# Patient Record
Sex: MALE | Race: WHITE | NOT HISPANIC OR LATINO | Employment: OTHER | URBAN - NONMETROPOLITAN AREA
[De-identification: names, ages, dates, MRNs, and addresses within clinical notes are randomized per-mention and may not be internally consistent; named-entity substitution may affect disease eponyms.]

---

## 2017-12-01 ENCOUNTER — OFFICE VISIT (OUTPATIENT)
Dept: UROLOGY | Facility: CLINIC | Age: 82
End: 2017-12-01

## 2017-12-01 DIAGNOSIS — R31.0 GROSS HEMATURIA: Primary | ICD-10-CM

## 2017-12-01 LAB
BILIRUB BLD-MCNC: NEGATIVE MG/DL
CLARITY, POC: CLEAR
COLOR UR: ABNORMAL
GLUCOSE UR STRIP-MCNC: NEGATIVE MG/DL
KETONES UR QL: NEGATIVE
LEUKOCYTE EST, POC: NEGATIVE
NITRITE UR-MCNC: NEGATIVE MG/ML
PH UR: 6 [PH] (ref 5–8)
PROT UR STRIP-MCNC: ABNORMAL MG/DL
RBC # UR STRIP: ABNORMAL /UL
SP GR UR: 1 (ref 1–1.03)
UROBILINOGEN UR QL: NORMAL

## 2017-12-01 PROCEDURE — 81003 URINALYSIS AUTO W/O SCOPE: CPT | Performed by: UROLOGY

## 2017-12-01 PROCEDURE — 99213 OFFICE O/P EST LOW 20 MIN: CPT | Performed by: UROLOGY

## 2017-12-01 RX ORDER — ASPIRIN 81 MG/1
81 TABLET ORAL EVERY OTHER DAY
COMMUNITY

## 2017-12-01 NOTE — PROGRESS NOTES
Chief Complaint:          Chief Complaint   Patient presents with   • Blood in Urine       HPI:   84 y.o. male.    HPI  Pt did not need to cath himself very long. He has not had to cath himself for over 2 years.  He does take asa 81 mg every other day.  He has had intermittent gross hematuria for the past 2 days.  Pt had a turp by me in 2014.      Past Medical History:        Past Medical History:   Diagnosis Date   • BPH with urinary obstruction          Current Meds:     Current Outpatient Prescriptions   Medication Sig Dispense Refill   • aspirin 81 MG EC tablet Take 81 mg by mouth Daily.       No current facility-administered medications for this visit.         Allergies:      No Known Allergies     Past Surgical History:     Past Surgical History:   Procedure Laterality Date   • PROSTATE SURGERY           Social History:     Social History     Social History   • Marital status: Unknown     Spouse name: N/A   • Number of children: N/A   • Years of education: N/A     Occupational History   • Not on file.     Social History Main Topics   • Smoking status: Never Smoker   • Smokeless tobacco: Not on file   • Alcohol use No   • Drug use: No   • Sexual activity: Not on file     Other Topics Concern   • Not on file     Social History Narrative   • No narrative on file       Family History:     Family History   Problem Relation Age of Onset   • No Known Problems Father    • No Known Problems Mother        Review of Systems:     Review of Systems    Physical Exam:     Physical Exam   Constitutional: He is oriented to person, place, and time.   HENT:   Head: Normocephalic and atraumatic.   Right Ear: External ear normal.   Left Ear: External ear normal.   Nose: Nose normal.   Mouth/Throat: Oropharynx is clear and moist.   Eyes: Conjunctivae and EOM are normal. Pupils are equal, round, and reactive to light.   Neck: Normal range of motion. Neck supple. No thyromegaly present.   Cardiovascular: Normal rate, regular rhythm,  normal heart sounds and intact distal pulses.    No murmur heard.  Pulmonary/Chest: Effort normal and breath sounds normal. No respiratory distress. He has no wheezes. He has no rales. He exhibits no tenderness.   Abdominal: Soft. Bowel sounds are normal. He exhibits no distension and no mass. There is no tenderness. No hernia.   Genitourinary: Rectum normal, prostate normal and penis normal.   Musculoskeletal: Normal range of motion. He exhibits no edema or tenderness.   Lymphadenopathy:     He has no cervical adenopathy.   Neurological: He is alert and oriented to person, place, and time. No cranial nerve deficit. He exhibits normal muscle tone. Coordination normal.   Skin: Skin is warm. No rash noted.   Psychiatric: He has a normal mood and affect. His behavior is normal. Judgment and thought content normal.   Nursing note and vitals reviewed.      Procedure:     No notes on file      Assessment:     Encounter Diagnosis   Name Primary?   • Gross hematuria Yes     Orders Placed This Encounter   Procedures   • POC Urinalysis Dipstick, Automated       Plan:   Will see pt after he has ct scan renal mass protocol for cystoscopy and will have him hold asa for about a week.    Counseling was given to patient and family for the following topics impressions. and the interim medical history and current results were reviewed.  A treatment plan with follow-up was made. Total time of the encounter was 25 minutes and 25 minutes were spent discussing Gross hematuria [R31.0] face-to-face.        This document has been electronically signed by Arsalan Valdovinos MD December 1, 2017 3:42 PM

## 2021-03-22 ENCOUNTER — HOSPITAL ENCOUNTER (OUTPATIENT)
Dept: GENERAL RADIOLOGY | Facility: HOSPITAL | Age: 86
Discharge: HOME | End: 2021-03-22
Admitting: NURSE PRACTITIONER

## 2021-03-22 ENCOUNTER — TRANSCRIBE ORDERS (OUTPATIENT)
Dept: OTHER | Facility: OTHER | Age: 86
End: 2021-03-22

## 2021-03-22 DIAGNOSIS — M54.50 LOW BACK PAIN, UNSPECIFIED BACK PAIN LATERALITY, UNSPECIFIED CHRONICITY, UNSPECIFIED WHETHER SCIATICA PRESENT: Primary | ICD-10-CM

## 2021-03-22 DIAGNOSIS — M54.50 LOW BACK PAIN, UNSPECIFIED BACK PAIN LATERALITY, UNSPECIFIED CHRONICITY, UNSPECIFIED WHETHER SCIATICA PRESENT: ICD-10-CM

## 2021-03-22 PROCEDURE — 72110 X-RAY EXAM L-2 SPINE 4/>VWS: CPT

## 2021-03-22 PROCEDURE — 72110 X-RAY EXAM L-2 SPINE 4/>VWS: CPT | Performed by: RADIOLOGY

## 2021-05-25 ENCOUNTER — OFFICE VISIT (OUTPATIENT)
Dept: UROLOGY | Facility: CLINIC | Age: 86
End: 2021-05-25

## 2021-05-25 ENCOUNTER — HOSPITAL ENCOUNTER (OUTPATIENT)
Dept: GENERAL RADIOLOGY | Facility: HOSPITAL | Age: 86
Discharge: HOME | End: 2021-05-25
Admitting: NURSE PRACTITIONER

## 2021-05-25 VITALS — BODY MASS INDEX: 25.62 KG/M2 | WEIGHT: 173 LBS | HEIGHT: 69 IN

## 2021-05-25 DIAGNOSIS — N39.0 URINARY TRACT INFECTION WITH HEMATURIA, SITE UNSPECIFIED: ICD-10-CM

## 2021-05-25 DIAGNOSIS — R31.0 GROSS HEMATURIA: ICD-10-CM

## 2021-05-25 DIAGNOSIS — R10.9 BILATERAL FLANK PAIN: ICD-10-CM

## 2021-05-25 DIAGNOSIS — R31.0 GROSS HEMATURIA: Primary | ICD-10-CM

## 2021-05-25 DIAGNOSIS — R31.9 URINARY TRACT INFECTION WITH HEMATURIA, SITE UNSPECIFIED: ICD-10-CM

## 2021-05-25 LAB
BILIRUB BLD-MCNC: NEGATIVE MG/DL
CLARITY, POC: ABNORMAL
COLOR UR: ABNORMAL
GLUCOSE UR STRIP-MCNC: NEGATIVE MG/DL
KETONES UR QL: NEGATIVE
LEUKOCYTE EST, POC: ABNORMAL
NITRITE UR-MCNC: POSITIVE MG/ML
PH UR: 6.5 [PH] (ref 5–8)
PROT UR STRIP-MCNC: NEGATIVE MG/DL
RBC # UR STRIP: ABNORMAL /UL
SP GR UR: 1.01 (ref 1–1.03)
UROBILINOGEN UR QL: NORMAL

## 2021-05-25 PROCEDURE — 99204 OFFICE O/P NEW MOD 45 MIN: CPT | Performed by: NURSE PRACTITIONER

## 2021-05-25 PROCEDURE — 96372 THER/PROPH/DIAG INJ SC/IM: CPT | Performed by: NURSE PRACTITIONER

## 2021-05-25 PROCEDURE — 74018 RADEX ABDOMEN 1 VIEW: CPT | Performed by: RADIOLOGY

## 2021-05-25 PROCEDURE — 74018 RADEX ABDOMEN 1 VIEW: CPT

## 2021-05-25 PROCEDURE — 81003 URINALYSIS AUTO W/O SCOPE: CPT | Performed by: NURSE PRACTITIONER

## 2021-05-25 PROCEDURE — 87086 URINE CULTURE/COLONY COUNT: CPT | Performed by: NURSE PRACTITIONER

## 2021-05-25 RX ORDER — SIMVASTATIN 10 MG
10 TABLET ORAL NIGHTLY
COMMUNITY

## 2021-05-25 RX ORDER — MELOXICAM 15 MG/1
1 TABLET ORAL DAILY
COMMUNITY
Start: 2021-03-22 | End: 2021-05-25

## 2021-05-25 RX ORDER — ONDANSETRON 4 MG/1
4 TABLET, FILM COATED ORAL EVERY 12 HOURS PRN
Qty: 20 TABLET | Refills: 0 | Status: SHIPPED | OUTPATIENT
Start: 2021-05-25

## 2021-05-25 RX ORDER — GENTAMICIN SULFATE 40 MG/ML
80 INJECTION, SOLUTION INTRAMUSCULAR; INTRAVENOUS ONCE
Status: COMPLETED | OUTPATIENT
Start: 2021-05-25 | End: 2021-05-25

## 2021-05-25 RX ORDER — SULFAMETHOXAZOLE AND TRIMETHOPRIM 800; 160 MG/1; MG/1
1 TABLET ORAL 2 TIMES DAILY
Qty: 28 TABLET | Refills: 0 | Status: SHIPPED | OUTPATIENT
Start: 2021-05-25 | End: 2021-06-08

## 2021-05-25 RX ORDER — TAMSULOSIN HYDROCHLORIDE 0.4 MG/1
1 CAPSULE ORAL DAILY
Qty: 30 CAPSULE | Refills: 1 | Status: SHIPPED | OUTPATIENT
Start: 2021-05-25 | End: 2022-11-07 | Stop reason: SDUPTHER

## 2021-05-25 RX ADMIN — GENTAMICIN SULFATE 80 MG: 40 INJECTION, SOLUTION INTRAMUSCULAR; INTRAVENOUS at 15:43

## 2021-05-26 LAB — BACTERIA SPEC AEROBE CULT: NORMAL

## 2021-05-27 ENCOUNTER — TELEPHONE (OUTPATIENT)
Dept: UROLOGY | Facility: CLINIC | Age: 86
End: 2021-05-27

## 2021-06-07 ENCOUNTER — APPOINTMENT (OUTPATIENT)
Dept: CT IMAGING | Facility: HOSPITAL | Age: 86
End: 2021-06-07

## 2021-06-07 NOTE — PATIENT INSTRUCTIONS
Urinary Tract Infection, Adult  A urinary tract infection (UTI) is an infection of any part of the urinary tract. The urinary tract includes:  · The kidneys.  · The ureters.  · The bladder.  · The urethra.  These organs make, store, and get rid of pee (urine) in the body.  What are the causes?  This is caused by germs (bacteria) in your genital area. These germs grow and cause swelling (inflammation) of your urinary tract.  What increases the risk?  You are more likely to develop this condition if:  · You have a small, thin tube (catheter) to drain pee.  · You cannot control when you pee or poop (incontinence).  · You are female, and:  ? You use these methods to prevent pregnancy:  § A medicine that kills sperm (spermicide).  § A device that blocks sperm (diaphragm).  ? You have low levels of a female hormone (estrogen).  ? You are pregnant.  · You have genes that add to your risk.  · You are sexually active.  · You take antibiotic medicines.  · You have trouble peeing because of:  ? A prostate that is bigger than normal, if you are male.  ? A blockage in the part of your body that drains pee from the bladder (urethra).  ? A kidney stone.  ? A nerve condition that affects your bladder (neurogenic bladder).  ? Not getting enough to drink.  ? Not peeing often enough.  · You have other conditions, such as:  ? Diabetes.  ? A weak disease-fighting system (immune system).  ? Sickle cell disease.  ? Gout.  ? Injury of the spine.  What are the signs or symptoms?  Symptoms of this condition include:  · Needing to pee right away (urgently).  · Peeing often.  · Peeing small amounts often.  · Pain or burning when peeing.  · Blood in the pee.  · Pee that smells bad or not like normal.  · Trouble peeing.  · Pee that is cloudy.  · Fluid coming from the vagina, if you are female.  · Pain in the belly or lower back.  Other symptoms include:  · Throwing up (vomiting).  · No urge to eat.  · Feeling mixed up (confused).  · Being tired  and grouchy (irritable).  · A fever.  · Watery poop (diarrhea).  How is this treated?  This condition may be treated with:  · Antibiotic medicine.  · Other medicines.  · Drinking enough water.  Follow these instructions at home:    Medicines  · Take over-the-counter and prescription medicines only as told by your doctor.  · If you were prescribed an antibiotic medicine, take it as told by your doctor. Do not stop taking it even if you start to feel better.  General instructions  · Make sure you:  ? Pee until your bladder is empty.  ? Do not hold pee for a long time.  ? Empty your bladder after sex.  ? Wipe from front to back after pooping if you are a female. Use each tissue one time when you wipe.  · Drink enough fluid to keep your pee pale yellow.  · Keep all follow-up visits as told by your doctor. This is important.  Contact a doctor if:  · You do not get better after 1-2 days.  · Your symptoms go away and then come back.  Get help right away if:  · You have very bad back pain.  · You have very bad pain in your lower belly.  · You have a fever.  · You are sick to your stomach (nauseous).  · You are throwing up.  Summary  · A urinary tract infection (UTI) is an infection of any part of the urinary tract.  · This condition is caused by germs in your genital area.  · There are many risk factors for a UTI. These include having a small, thin tube to drain pee and not being able to control when you pee or poop.  · Treatment includes antibiotic medicines for germs.  · Drink enough fluid to keep your pee pale yellow.  This information is not intended to replace advice given to you by your health care provider. Make sure you discuss any questions you have with your health care provider.  Document Revised: 12/05/2019 Document Reviewed: 06/27/2019  [employer] Patient Education © 2021 [employer]  Constipation, Adult  Constipation is when a person has trouble pooping (having a bowel movement). When you have this condition,  you may poop fewer than 3 times a week. Your poop (stool) may also be dry, hard, or bigger than normal.  Follow these instructions at home:  Eating and drinking    · Eat foods that have a lot of fiber, such as:  ? Fresh fruits and vegetables.  ? Whole grains.  ? Beans.  · Eat less of foods that are low in fiber and high in fat and sugar, such as:  ? French fries.  ? Hamburgers.  ? Cookies.  ? Candy.  ? Soda.  · Drink enough fluid to keep your pee (urine) pale yellow.  General instructions  · Exercise regularly or as told by your doctor. Try to do 150 minutes of exercise each week.  · Go to the restroom when you feel like you need to poop. Do not hold it in.  · Take over-the-counter and prescription medicines only as told by your doctor. These include any fiber supplements.  · When you poop:  ? Do deep breathing while relaxing your lower belly (abdomen).  ? Relax your pelvic floor. The pelvic floor is a group of muscles that support the rectum, bladder, and intestines (as well as the uterus in women).  · Watch your condition for any changes. Tell your doctor if you notice any.  · Keep all follow-up visits as told by your doctor. This is important.  Contact a doctor if:  · You have pain that gets worse.  · You have a fever.  · You have not pooped for 4 days.  · You vomit.  · You are not hungry.  · You lose weight.  · You are bleeding from the opening of the butt (anus).  · You have thin, pencil-like poop.  Get help right away if:  · You have a fever, and your symptoms suddenly get worse.  · You leak poop or have blood in your poop.  · Your belly feels hard or bigger than normal (bloated).  · You have very bad belly pain.  · You feel dizzy or you faint.  Summary  · Constipation is when a person poops fewer than 3 times a week, has trouble pooping, or has poop that is dry, hard, or bigger than normal.  · Eat foods that have a lot of fiber.  · Drink enough fluid to keep your pee (urine) pale yellow.  · Take  over-the-counter and prescription medicines only as told by your doctor. These include any fiber supplements.  This information is not intended to replace advice given to you by your health care provider. Make sure you discuss any questions you have with your health care provider.  Document Revised: 11/04/2020 Document Reviewed: 11/04/2020  [employer] Patient Education © 2021 [employer]  Benign Prostatic Hyperplasia    Benign prostatic hyperplasia (BPH) is an enlarged prostate gland that is caused by the normal aging process and not by cancer. The prostate is a walnut-sized gland that is involved in the production of semen. It is located in front of the rectum and below the bladder. The bladder stores urine and the urethra is the tube that carries the urine out of the body. The prostate may get bigger as a man gets older.  An enlarged prostate can press on the urethra. This can make it harder to pass urine. The build-up of urine in the bladder can cause infection. Back pressure and infection may progress to bladder damage and kidney (renal) failure.  What are the causes?  This condition is part of a normal aging process. However, not all men develop problems from this condition. If the prostate enlarges away from the urethra, urine flow will not be blocked. If it enlarges toward the urethra and compresses it, there will be problems passing urine.  What increases the risk?  This condition is more likely to develop in men over the age of 50 years.  What are the signs or symptoms?  Symptoms of this condition include:  · Getting up often during the night to urinate.  · Needing to urinate frequently during the day.  · Difficulty starting urine flow.  · Decrease in size and strength of your urine stream.  · Leaking (dribbling) after urinating.  · Inability to pass urine. This needs immediate treatment.  · Inability to completely empty your bladder.  · Pain when you pass urine. This is more common if there is also an  infection.  · Urinary tract infection (UTI).  How is this diagnosed?  This condition is diagnosed based on your medical history, a physical exam, and your symptoms. Tests will also be done, such as:  · A post-void bladder scan. This measures any amount of urine that may remain in your bladder after you finish urinating.  · A digital rectal exam. In a rectal exam, your health care provider checks your prostate by putting a lubricated, gloved finger into your rectum to feel the back of your prostate gland. This exam detects the size of your gland and any abnormal lumps or growths.  · An exam of your urine (urinalysis).  · A prostate specific antigen (PSA) screening. This is a blood test used to screen for prostate cancer.  · An ultrasound. This test uses sound waves to electronically produce a picture of your prostate gland.  Your health care provider may refer you to a specialist in kidney and prostate diseases (urologist).  How is this treated?  Once symptoms begin, your health care provider will monitor your condition (active surveillance or watchful waiting). Treatment for this condition will depend on the severity of your condition. Treatment may include:  · Observation and yearly exams. This may be the only treatment needed if your condition and symptoms are mild.  · Medicines to relieve your symptoms, including:  ? Medicines to shrink the prostate.  ? Medicines to relax the muscle of the prostate.  · Surgery in severe cases. Surgery may include:  ? Prostatectomy. In this procedure, the prostate tissue is removed completely through an open incision or with a laparoscope or robotics.  ? Transurethral resection of the prostate (TURP). In this procedure, a tool is inserted through the opening at the tip of the penis (urethra). It is used to cut away tissue of the inner core of the prostate. The pieces are removed through the same opening of the penis. This removes the blockage.  ? Transurethral incision (TUIP). In  this procedure, small cuts are made in the prostate. This lessens the prostate's pressure on the urethra.  ? Transurethral microwave thermotherapy (TUMT). This procedure uses microwaves to create heat. The heat destroys and removes a small amount of prostate tissue.  ? Transurethral needle ablation (TUNA). This procedure uses radio frequencies to destroy and remove a small amount of prostate tissue.  ? Interstitial laser coagulation (ILC). This procedure uses a laser to destroy and remove a small amount of prostate tissue.  ? Transurethral electrovaporization (TUVP). This procedure uses electrodes to destroy and remove a small amount of prostate tissue.  ? Prostatic urethral lift. This procedure inserts an implant to push the lobes of the prostate away from the urethra.  Follow these instructions at home:  · Take over-the-counter and prescription medicines only as told by your health care provider.  · Monitor your symptoms for any changes. Contact your health care provider with any changes.  · Avoid drinking large amounts of liquid before going to bed or out in public.  · Avoid or reduce how much caffeine or alcohol you drink.  · Give yourself time when you urinate.  · Keep all follow-up visits as told by your health care provider. This is important.  Contact a health care provider if:  · You have unexplained back pain.  · Your symptoms do not get better with treatment.  · You develop side effects from the medicine you are taking.  · Your urine becomes very dark or has a bad smell.  · Your lower abdomen becomes distended and you have trouble passing your urine.  Get help right away if:  · You have a fever or chills.  · You suddenly cannot urinate.  · You feel lightheaded, or very dizzy, or you faint.  · There are large amounts of blood or clots in the urine.  · Your urinary problems become hard to manage.  · You develop moderate to severe low back or flank pain. The flank is the side of your body between the ribs  and the hip.  These symptoms may represent a serious problem that is an emergency. Do not wait to see if the symptoms will go away. Get medical help right away. Call your local emergency services (911 in the [place] Do not drive yourself to the hospital.  Summary  · Benign prostatic hyperplasia (BPH) is an enlarged prostate that is caused by the normal aging process and not by cancer.  · An enlarged prostate can press on the urethra. This can make it hard to pass urine.  · This condition is part of a normal aging process and is more likely to develop in men over the age of 50 years.  · Get help right away if you suddenly cannot urinate.  This information is not intended to replace advice given to you by your health care provider. Make sure you discuss any questions you have with your health care provider.  Document Revised: 11/12/2019 Document Reviewed: 01/22/2018  [employer] Patient Education © 2021 [employer]  Hematuria, Adult  Hematuria is blood in the urine. Blood may be visible in the urine, or it may be identified with a test. This condition can be caused by infections of the bladder, urethra, kidney, or prostate. Other possible causes include:  · Kidney stones.  · Cancer of the urinary tract.  · Too much calcium in the urine.  · Conditions that are passed from parent to child (inherited conditions).  · Exercise that requires a lot of energy.  Infections can usually be treated with medicine, and a kidney stone usually will pass through your urine. If neither of these is the cause of your hematuria, more tests may be needed to identify the cause of your symptoms.  It is very important to tell your health care provider about any blood in your urine, even if it is painless or the blood stops without treatment. Blood in the urine, when it happens and then stops and then happens again, can be a symptom of a very serious condition, including cancer. There is no pain in the initial stages of many urinary  cancers.  Follow these instructions at home:  Medicines  · Take over-the-counter and prescription medicines only as told by your health care provider.  · If you were prescribed an antibiotic medicine, take it as told by your health care provider. Do not stop taking the antibiotic even if you start to feel better.  Eating and drinking  · Drink enough fluid to keep your urine clear or pale yellow. It is recommended that you drink 3-4 quarts (2.8-3.8 L) a day. If you have been diagnosed with an infection, it is recommended that you drink cranberry juice in addition to large amounts of water.  · Avoid caffeine, tea, and carbonated beverages. These tend to irritate the bladder.  · Avoid alcohol because it may irritate the prostate (men).  General instructions  · If you have been diagnosed with a kidney stone, follow your health care provider's instructions about straining your urine to catch the stone.  · Empty your bladder often. Avoid holding urine for long periods of time.  · If you are female:  ? After a bowel movement, wipe from front to back and use each piece of toilet paper only once.  ? Empty your bladder before and after sex.  · Pay attention to any changes in your symptoms. Tell your health care provider about any changes or any new symptoms.  · It is your responsibility to get your test results. Ask your health care provider, or the department performing the test, when your results will be ready.  · Keep all follow-up visits as told by your health care provider. This is important.  Contact a health care provider if:  · You develop back pain.  · You have a fever.  · You have nausea or vomiting.  · Your symptoms do not improve after 3 days.  · Your symptoms get worse.  Get help right away if:  · You develop severe vomiting and are unable take medicine without vomiting.  · You develop severe pain in your back or abdomen even though you are taking medicine.  · You pass a large amount of blood in your  urine.  · You pass blood clots in your urine.  · You feel very weak or like you might faint.  · You faint.  Summary  · Hematuria is blood in the urine. It has many possible causes.  · It is very important that you tell your health care provider about any blood in your urine, even if it is painless or the blood stops without treatment.  · Take over-the-counter and prescription medicines only as told by your health care provider.  · Drink enough fluid to keep your urine clear or pale yellow.  This information is not intended to replace advice given to you by your health care provider. Make sure you discuss any questions you have with your health care provider.  Document Revised: 05/13/2020 Document Reviewed: 01/20/2018  [employer] Patient Education © 2021 [employer]

## 2022-05-13 ENCOUNTER — APPOINTMENT (OUTPATIENT)
Dept: CT IMAGING | Facility: HOSPITAL | Age: 87
End: 2022-05-13

## 2022-05-13 ENCOUNTER — HOSPITAL ENCOUNTER (EMERGENCY)
Facility: HOSPITAL | Age: 87
Discharge: HOME | End: 2022-05-13
Attending: EMERGENCY MEDICINE | Admitting: EMERGENCY MEDICINE

## 2022-05-13 ENCOUNTER — APPOINTMENT (OUTPATIENT)
Dept: GENERAL RADIOLOGY | Facility: HOSPITAL | Age: 87
End: 2022-05-13

## 2022-05-13 VITALS
SYSTOLIC BLOOD PRESSURE: 166 MMHG | BODY MASS INDEX: 25.18 KG/M2 | RESPIRATION RATE: 18 BRPM | TEMPERATURE: 97.7 F | HEIGHT: 69 IN | WEIGHT: 170 LBS | HEART RATE: 105 BPM | DIASTOLIC BLOOD PRESSURE: 84 MMHG | OXYGEN SATURATION: 94 %
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
## 2022-05-13 DIAGNOSIS — S20.212A CONTUSION OF LEFT CHEST WALL, INITIAL ENCOUNTER: ICD-10-CM

## 2022-05-13 DIAGNOSIS — S51.811A SKIN TEAR OF RIGHT FOREARM WITHOUT COMPLICATION, INITIAL ENCOUNTER: Primary | ICD-10-CM

## 2022-05-13 LAB
ALBUMIN SERPL-MCNC: 4.1 G/DL (ref 3.5–5.2)
ALBUMIN/GLOB SERPL: 1.2 G/DL
ALP SERPL-CCNC: 84 U/L (ref 39–117)
ALT SERPL W P-5'-P-CCNC: 14 U/L (ref 1–41)
ANION GAP SERPL CALCULATED.3IONS-SCNC: 11.6 MMOL/L (ref 5–15)
AST SERPL-CCNC: 20 U/L (ref 1–40)
BASOPHILS # BLD AUTO: 0.12 10*3/MM3 (ref 0–0.2)
BASOPHILS NFR BLD AUTO: 1.2 % (ref 0–1.5)
BILIRUB SERPL-MCNC: 0.5 MG/DL (ref 0–1.2)
BUN SERPL-MCNC: 12 MG/DL (ref 8–23)
BUN/CREAT SERPL: 13.6 (ref 7–25)
CALCIUM SPEC-SCNC: 9.4 MG/DL (ref 8.6–10.5)
CHLORIDE SERPL-SCNC: 97 MMOL/L (ref 98–107)
CO2 SERPL-SCNC: 22.4 MMOL/L (ref 22–29)
CREAT SERPL-MCNC: 0.88 MG/DL (ref 0.76–1.27)
DEPRECATED RDW RBC AUTO: 50.1 FL (ref 37–54)
EGFRCR SERPLBLD CKD-EPI 2021: 82.2 ML/MIN/1.73
EOSINOPHIL # BLD AUTO: 0.79 10*3/MM3 (ref 0–0.4)
EOSINOPHIL NFR BLD AUTO: 8.2 % (ref 0.3–6.2)
ERYTHROCYTE [DISTWIDTH] IN BLOOD BY AUTOMATED COUNT: 14.7 % (ref 12.3–15.4)
GLOBULIN UR ELPH-MCNC: 3.4 GM/DL
GLUCOSE SERPL-MCNC: 112 MG/DL (ref 65–99)
HCT VFR BLD AUTO: 48.2 % (ref 37.5–51)
HGB BLD-MCNC: 15.5 G/DL (ref 13–17.7)
IMM GRANULOCYTES # BLD AUTO: 0.04 10*3/MM3 (ref 0–0.05)
IMM GRANULOCYTES NFR BLD AUTO: 0.4 % (ref 0–0.5)
LYMPHOCYTES # BLD AUTO: 2.27 10*3/MM3 (ref 0.7–3.1)
LYMPHOCYTES NFR BLD AUTO: 23.5 % (ref 19.6–45.3)
MCH RBC QN AUTO: 29.6 PG (ref 26.6–33)
MCHC RBC AUTO-ENTMCNC: 32.2 G/DL (ref 31.5–35.7)
MCV RBC AUTO: 92.2 FL (ref 79–97)
MONOCYTES # BLD AUTO: 0.88 10*3/MM3 (ref 0.1–0.9)
MONOCYTES NFR BLD AUTO: 9.1 % (ref 5–12)
NEUTROPHILS NFR BLD AUTO: 5.54 10*3/MM3 (ref 1.7–7)
NEUTROPHILS NFR BLD AUTO: 57.6 % (ref 42.7–76)
NRBC BLD AUTO-RTO: 0 /100 WBC (ref 0–0.2)
PLATELET # BLD AUTO: 216 10*3/MM3 (ref 140–450)
PMV BLD AUTO: 9.3 FL (ref 6–12)
POTASSIUM SERPL-SCNC: 4.1 MMOL/L (ref 3.5–5.2)
PROT SERPL-MCNC: 7.5 G/DL (ref 6–8.5)
QT INTERVAL: 332 MS
QTC INTERVAL: 432 MS
RBC # BLD AUTO: 5.23 10*6/MM3 (ref 4.14–5.8)
SODIUM SERPL-SCNC: 131 MMOL/L (ref 136–145)
TROPONIN T SERPL-MCNC: <0.01 NG/ML (ref 0–0.03)
WBC NRBC COR # BLD: 9.64 10*3/MM3 (ref 3.4–10.8)

## 2022-05-13 PROCEDURE — 93005 ELECTROCARDIOGRAM TRACING: CPT | Performed by: PHYSICIAN ASSISTANT

## 2022-05-13 PROCEDURE — 70450 CT HEAD/BRAIN W/O DYE: CPT

## 2022-05-13 PROCEDURE — 73090 X-RAY EXAM OF FOREARM: CPT | Performed by: RADIOLOGY

## 2022-05-13 PROCEDURE — 71111 X-RAY EXAM RIBS/CHEST4/> VWS: CPT

## 2022-05-13 PROCEDURE — 73090 X-RAY EXAM OF FOREARM: CPT

## 2022-05-13 PROCEDURE — 85025 COMPLETE CBC W/AUTO DIFF WBC: CPT | Performed by: PHYSICIAN ASSISTANT

## 2022-05-13 PROCEDURE — 72125 CT NECK SPINE W/O DYE: CPT

## 2022-05-13 PROCEDURE — 70450 CT HEAD/BRAIN W/O DYE: CPT | Performed by: RADIOLOGY

## 2022-05-13 PROCEDURE — 84484 ASSAY OF TROPONIN QUANT: CPT | Performed by: PHYSICIAN ASSISTANT

## 2022-05-13 PROCEDURE — 80053 COMPREHEN METABOLIC PANEL: CPT | Performed by: PHYSICIAN ASSISTANT

## 2022-05-13 PROCEDURE — 99283 EMERGENCY DEPT VISIT LOW MDM: CPT

## 2022-05-13 PROCEDURE — 72125 CT NECK SPINE W/O DYE: CPT | Performed by: RADIOLOGY

## 2022-05-13 PROCEDURE — 71111 X-RAY EXAM RIBS/CHEST4/> VWS: CPT | Performed by: RADIOLOGY

## 2022-05-13 NOTE — DISCHARGE INSTRUCTIONS
Follow up with your primary care provider in 2-3 days.    Return to the emergency room for worsening symptoms
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
## 2022-05-16 ENCOUNTER — NURSE TRIAGE (OUTPATIENT)
Dept: CALL CENTER | Facility: HOSPITAL | Age: 87
End: 2022-05-16
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
## 2022-11-07 ENCOUNTER — OFFICE VISIT (OUTPATIENT)
Dept: UROLOGY | Facility: CLINIC | Age: 87
End: 2022-11-07

## 2022-11-07 VITALS — BODY MASS INDEX: 25.18 KG/M2 | HEIGHT: 69 IN | WEIGHT: 170 LBS

## 2022-11-07 DIAGNOSIS — R35.0 FREQUENCY OF URINATION: Primary | ICD-10-CM

## 2022-11-07 PROCEDURE — 99213 OFFICE O/P EST LOW 20 MIN: CPT

## 2022-11-07 RX ORDER — TAMSULOSIN HYDROCHLORIDE 0.4 MG/1
1 CAPSULE ORAL DAILY
Qty: 30 CAPSULE | Refills: 0 | Status: SHIPPED | OUTPATIENT
Start: 2022-11-07 | End: 2022-12-07 | Stop reason: SDUPTHER

## 2022-11-07 NOTE — PROGRESS NOTES
"Chief Complaint:    Chief Complaint   Patient presents with   • Urinary Frequency       Vital Signs:   Ht 175.3 cm (69.02\")   Wt 77.1 kg (170 lb)   BMI 25.09 kg/m²   Body mass index is 25.09 kg/m².      HPI:  Hugo Garcia is a 89 y.o. male who presents today for follow up    History of Present Illness  Mr. Garcia presents the clinic with his wife for urinary frequency.  His wife reports that he often gets confused.  He was last seen by Daniel in 2021 for BPH and gross hematuria.  The patient and his wife report that he has not had any gross hematuria recently.  He states that at this time that he gets up to urinate roughly 4-5 times at night.  Denies any decreased stream, difficulty urinating, pushing or straining to begin urination, stopping and starting again during urination, [place], or dysuria.  Reports minimal urinary incontinence at this time and only uses 1-2 pads per day.  He is currently not on any medication for urinary symptoms.      Past Medical History:  Past Medical History:   Diagnosis Date   • BPH with urinary obstruction        Current Meds:  Current Outpatient Medications   Medication Sig Dispense Refill   • aspirin 81 MG EC tablet Take 81 mg by mouth Every Other Day.     • ondansetron (Zofran) 4 MG tablet Take 1 tablet by mouth Every 12 (Twelve) Hours As Needed for Nausea. 20 tablet 0   • simvastatin (ZOCOR) 10 MG tablet Take 10 mg by mouth Every Night.     • tamsulosin (FLOMAX) 0.4 MG capsule 24 hr capsule Take 1 capsule by mouth Daily. 30 capsule 0     No current facility-administered medications for this visit.        Allergies:   No Known Allergies     Past Surgical History:  Past Surgical History:   Procedure Laterality Date   • PROSTATE SURGERY         Social History:  Social History     Socioeconomic History   • Marital status: Unknown   Tobacco Use   • Smoking status: Never   • Smokeless tobacco: Never   Substance and Sexual Activity   • Alcohol use: No   • Drug use: No       Family " History:  Family History   Problem Relation Age of Onset   • No Known Problems Father    • No Known Problems Mother        Review of Systems:  Review of Systems   Constitutional: Negative for chills, fatigue, fever and unexpected weight change.   Respiratory: Positive for cough. Negative for chest tightness, shortness of breath and wheezing.    Cardiovascular: Negative for chest pain and leg swelling.   Gastrointestinal: Negative for abdominal pain, constipation, diarrhea, nausea and vomiting.   Genitourinary: Positive for frequency. Negative for difficulty urinating, dysuria, hematuria and urgency.   Musculoskeletal: Positive for back pain. Negative for joint swelling.   Skin: Negative for rash.   Neurological: Negative for dizziness and headaches.   Psychiatric/Behavioral: Positive for confusion. Negative for suicidal ideas.       Physical Exam:  Physical Exam                 Recent Image (CT and/or KUB):   CT Abdomen and Pelvis: No results found for this or any previous visit.     CT Stone Protocol: No results found for this or any previous visit.     KUB: Results for orders placed during the hospital encounter of 05/25/21    XR Abdomen KUB    Narrative  EXAM:  XR Abdomen, 1 View    EXAM DATE:  5/25/2021 3:56 PM    CLINICAL HISTORY:  BILATERAL FLANK PAIN; R31.0-Gross hematuria; R10.9-Unspecified  abdominal pain    TECHNIQUE:  Frontal supine view of the abdomen/pelvis.    COMPARISON:  No relevant prior studies available.    FINDINGS:  GASTROINTESTINAL TRACT:  Abundant stool throughout the colon.  No  dilation.  BONES/JOINTS:  Unremarkable.    Impression  Abundant stool throughout the colon.    This report was finalized on 5/26/2021 8:49 AM by Dr. Ayden Nicole MD.       Labs:  Brief Urine Lab Results     None        No visits with results within 3 Month(s) from this visit.   Latest known visit with results is:   Admission on 05/13/2022, Discharged on 05/13/2022   Component Date Value Ref Range Status   • Glucose  05/13/2022 112 (H)  65 - 99 mg/dL Final   • BUN 05/13/2022 12  8 - 23 mg/dL Final   • Creatinine 05/13/2022 0.88  0.76 - 1.27 mg/dL Final   • Sodium 05/13/2022 131 (L)  136 - 145 mmol/L Final   • Potassium 05/13/2022 4.1  3.5 - 5.2 mmol/L Final   • Chloride 05/13/2022 97 (L)  98 - 107 mmol/L Final   • CO2 05/13/2022 22.4  22.0 - 29.0 mmol/L Final   • Calcium 05/13/2022 9.4  8.6 - 10.5 mg/dL Final   • Total Protein 05/13/2022 7.5  6.0 - 8.5 g/dL Final   • Albumin 05/13/2022 4.10  3.50 - 5.20 g/dL Final   • ALT (SGPT) 05/13/2022 14  1 - 41 U/L Final   • AST (SGOT) 05/13/2022 20  1 - 40 U/L Final   • Alkaline Phosphatase 05/13/2022 84  39 - 117 U/L Final   • Total Bilirubin 05/13/2022 0.5  0.0 - 1.2 mg/dL Final   • Globulin 05/13/2022 3.4  gm/dL Final   • A/G Ratio 05/13/2022 1.2  g/dL Final   • BUN/Creatinine Ratio 05/13/2022 13.6  7.0 - 25.0 Final   • Anion Gap 05/13/2022 11.6  5.0 - 15.0 mmol/L Final   • eGFR 05/13/2022 82.2  >60.0 mL/min/1.73 Final    National Kidney Foundation and American Society of Nephrology (ASN) Task Force recommended calculation based on the Chronic Kidney Disease Epidemiology Collaboration (CKD-EPI) equation refit without adjustment for race.   • QT Interval 05/13/2022 332  ms Final   • QTC Interval 05/13/2022 432  ms Final   • Troponin T 05/13/2022 <0.010  0.000 - 0.030 ng/mL Final   • WBC 05/13/2022 9.64  3.40 - 10.80 10*3/mm3 Final   • RBC 05/13/2022 5.23  4.14 - 5.80 10*6/mm3 Final   • Hemoglobin 05/13/2022 15.5  13.0 - 17.7 g/dL Final   • Hematocrit 05/13/2022 48.2  37.5 - 51.0 % Final   • MCV 05/13/2022 92.2  79.0 - 97.0 fL Final   • MCH 05/13/2022 29.6  26.6 - 33.0 pg Final   • MCHC 05/13/2022 32.2  31.5 - 35.7 g/dL Final   • RDW 05/13/2022 14.7  12.3 - 15.4 % Final   • RDW-SD 05/13/2022 50.1  37.0 - 54.0 fl Final   • MPV 05/13/2022 9.3  6.0 - 12.0 fL Final   • Platelets 05/13/2022 216  140 - 450 10*3/mm3 Final   • Neutrophil % 05/13/2022 57.6  42.7 - 76.0 % Final   • Lymphocyte %  05/13/2022 23.5  19.6 - 45.3 % Final   • Monocyte % 05/13/2022 9.1  5.0 - 12.0 % Final   • Eosinophil % 05/13/2022 8.2 (H)  0.3 - 6.2 % Final   • Basophil % 05/13/2022 1.2  0.0 - 1.5 % Final   • Immature Grans % 05/13/2022 0.4  0.0 - 0.5 % Final   • Neutrophils, Absolute 05/13/2022 5.54  1.70 - 7.00 10*3/mm3 Final   • Lymphocytes, Absolute 05/13/2022 2.27  0.70 - 3.10 10*3/mm3 Final   • Monocytes, Absolute 05/13/2022 0.88  0.10 - 0.90 10*3/mm3 Final   • Eosinophils, Absolute 05/13/2022 0.79 (H)  0.00 - 0.40 10*3/mm3 Final   • Basophils, Absolute 05/13/2022 0.12  0.00 - 0.20 10*3/mm3 Final   • Immature Grans, Absolute 05/13/2022 0.04  0.00 - 0.05 10*3/mm3 Final   • nRBC 05/13/2022 0.0  0.0 - 0.2 /100 WBC Final        Procedure: None  Procedures     I have reviewed and agree with the above PMH, PSH, FMH, social history, medications, allergies, and labs.     Assessment/Plan:   Problem List Items Addressed This Visit    None  Visit Diagnoses     Frequency of urination    -  Primary    Relevant Medications    tamsulosin (FLOMAX) 0.4 MG capsule 24 hr capsule          Health Maintenance:   Health Maintenance Due   Topic Date Due   • TDAP/TD VACCINES (1 - Tdap) Never done   • ZOSTER VACCINE (1 of 2) Never done   • Pneumococcal Vaccine 65+ (1 - PCV) Never done   • ANNUAL WELLNESS VISIT  Never done   • COVID-19 Vaccine (3 - Booster for Pfizer series) 06/26/2021        Smoking Counseling: Patient has never smoked or use smokeless tobacco.    Urine Incontinence: Patient reports that he is experiencing minimal symptoms urinary incontinence at this time.  He uses roughly 1-2 pads per day.    Patient was given instructions and counseling regarding his condition or for health maintenance advice. Please see specific information pulled into the AVS if appropriate.    Patient Education:   BPH with urinary frequency - Discussed the pathophysiology of BPH and obstruction.  We discussed the static and dynamic effects of BPH as well as  using 5 alpha reductase inhibitors versus alpha blockade.  We discussed the indications for transurethral surgery as well and/ or other therapeutic options available including all of the newer techniques.  I also discussed with the patient the use of anticholinergic medication to help with urinary frequency.  I discussed the risks and side effects of these medications.  I advised patient and wife that he may experience dry mouth, dry eyes, increase in blood pressure, mental status changes, or constipation.  Wife states that this time she would not like to try any anticholinergics.  I am recommending a trial of Flomax once nightly to help with urinary symptoms.  I discussed the risk and side effects of this medication as well.  I will follow-up with the patient in 1 month for reevaluation of symptoms.  Patient and wife both verbalized understanding and agreed to plan of care.    Visit Diagnoses:    ICD-10-CM ICD-9-CM   1. Frequency of urination  R35.0 788.41       Meds Ordered During Visit:  New Medications Ordered This Visit   Medications   • tamsulosin (FLOMAX) 0.4 MG capsule 24 hr capsule     Sig: Take 1 capsule by mouth Daily.     Dispense:  30 capsule     Refill:  0       Follow Up Appointment: 1 month  No follow-ups on file.      This document has been electronically signed by Michel Mendez PA-C   November 8, 2022 08:37 EST    Part of this note may be an electronic transcription/translation of spoken language to printed text using the Dragon Dictation System.

## 2022-12-07 ENCOUNTER — OFFICE VISIT (OUTPATIENT)
Dept: UROLOGY | Facility: CLINIC | Age: 87
End: 2022-12-07

## 2022-12-07 VITALS — HEIGHT: 69 IN | BODY MASS INDEX: 25.18 KG/M2 | WEIGHT: 170 LBS

## 2022-12-07 DIAGNOSIS — N39.0 URINARY TRACT INFECTION WITH HEMATURIA, SITE UNSPECIFIED: ICD-10-CM

## 2022-12-07 DIAGNOSIS — R35.0 FREQUENCY OF MICTURITION: Primary | ICD-10-CM

## 2022-12-07 DIAGNOSIS — R31.9 URINARY TRACT INFECTION WITH HEMATURIA, SITE UNSPECIFIED: ICD-10-CM

## 2022-12-07 LAB
BILIRUB BLD-MCNC: NEGATIVE MG/DL
CLARITY, POC: ABNORMAL
COLOR UR: YELLOW
EXPIRATION DATE: ABNORMAL
GLUCOSE UR STRIP-MCNC: NEGATIVE MG/DL
KETONES UR QL: NEGATIVE
LEUKOCYTE EST, POC: ABNORMAL
Lab: ABNORMAL
NITRITE UR-MCNC: POSITIVE MG/ML
PH UR: 6 [PH] (ref 5–8)
PROT UR STRIP-MCNC: ABNORMAL MG/DL
RBC # UR STRIP: ABNORMAL /UL
SP GR UR: 1.02 (ref 1–1.03)
UROBILINOGEN UR QL: NORMAL

## 2022-12-07 PROCEDURE — 99213 OFFICE O/P EST LOW 20 MIN: CPT

## 2022-12-07 PROCEDURE — 87186 SC STD MICRODIL/AGAR DIL: CPT

## 2022-12-07 PROCEDURE — 87077 CULTURE AEROBIC IDENTIFY: CPT

## 2022-12-07 PROCEDURE — 87086 URINE CULTURE/COLONY COUNT: CPT

## 2022-12-07 PROCEDURE — 81003 URINALYSIS AUTO W/O SCOPE: CPT

## 2022-12-07 PROCEDURE — 51798 US URINE CAPACITY MEASURE: CPT

## 2022-12-07 RX ORDER — NITROFURANTOIN 25; 75 MG/1; MG/1
100 CAPSULE ORAL 2 TIMES DAILY
Qty: 10 CAPSULE | Refills: 0 | Status: SHIPPED | OUTPATIENT
Start: 2022-12-07 | End: 2022-12-09 | Stop reason: SDUPTHER

## 2022-12-07 RX ORDER — TAMSULOSIN HYDROCHLORIDE 0.4 MG/1
1 CAPSULE ORAL DAILY
Qty: 90 CAPSULE | Refills: 3 | Status: SHIPPED | OUTPATIENT
Start: 2022-12-07

## 2022-12-07 NOTE — PROGRESS NOTES
"Chief Complaint:    Chief Complaint   Patient presents with   • Urinary Frequency       Vital Signs:   Ht 175.3 cm (69.02\")   Wt 77.1 kg (170 lb)   BMI 25.09 kg/m²   Body mass index is 25.09 kg/m².      HPI:  Hugo Garcia is a 89 y.o. male who presents today for follow up    History of Present Illness  Mr. Garcia presents to the clinic for a 1 month follow-up for frequency and urinary incontinence.  He is a poor historian and unable to answer questions well.  His wife states that he still gets up roughly 3-4 times throughout the night.  She states that he goes to the bathroom every hour to 2 hours.  He denies any weak stream, pushing or straining to begin urination, stopping or starting again during urination, or feeling of incomplete bladder emptying.  Patient was able to urinate in office today and had a postvoid residual of 0 mL.  His UA was positive for nitrites, 3+ leukocytes, and 3+ blood.  I will start the patient on a short course of Macrobid twice daily and culture his urine for signs of growth.  We will have him continue Flomax and start Myrbetriq 25 mg once nightly.  Patient and his wife would like to follow-up in 3 months.      Past Medical History:  Past Medical History:   Diagnosis Date   • BPH with urinary obstruction        Current Meds:  Current Outpatient Medications   Medication Sig Dispense Refill   • aspirin 81 MG EC tablet Take 81 mg by mouth Every Other Day.     • ondansetron (Zofran) 4 MG tablet Take 1 tablet by mouth Every 12 (Twelve) Hours As Needed for Nausea. 20 tablet 0   • simvastatin (ZOCOR) 10 MG tablet Take 10 mg by mouth Every Night.     • nitrofurantoin, macrocrystal-monohydrate, (Macrobid) 100 MG capsule Take 1 capsule by mouth 2 (Two) Times a Day. 10 capsule 0   • tamsulosin (FLOMAX) 0.4 MG capsule 24 hr capsule Take 1 capsule by mouth Daily. 90 capsule 3     No current facility-administered medications for this visit.        Allergies:   No Known Allergies     Past Surgical " History:  Past Surgical History:   Procedure Laterality Date   • PROSTATE SURGERY         Social History:  Social History     Socioeconomic History   • Marital status: Unknown   Tobacco Use   • Smoking status: Never   • Smokeless tobacco: Never   Substance and Sexual Activity   • Alcohol use: No   • Drug use: No       Family History:  Family History   Problem Relation Age of Onset   • No Known Problems Father    • No Known Problems Mother        Review of Systems:  Review of Systems   Constitutional: Negative for chills, fatigue, fever and unexpected weight change.   Respiratory: Positive for cough. Negative for chest tightness, shortness of breath and wheezing.    Cardiovascular: Negative for chest pain and leg swelling.   Gastrointestinal: Negative for abdominal pain, constipation, diarrhea, nausea and vomiting.   Genitourinary: Positive for frequency. Negative for difficulty urinating, dysuria, hematuria and urgency.   Musculoskeletal: Positive for back pain. Negative for joint swelling.   Skin: Negative for rash.   Neurological: Negative for dizziness and headaches.   Psychiatric/Behavioral: Positive for confusion. Negative for suicidal ideas.       Physical Exam:  Physical Exam  Constitutional:       General: He is not in acute distress.     Appearance: Normal appearance.   HENT:      Head: Normocephalic and atraumatic.      Nose: Nose normal.      Mouth/Throat:      Mouth: Mucous membranes are moist.   Eyes:      Conjunctiva/sclera: Conjunctivae normal.   Cardiovascular:      Rate and Rhythm: Normal rate and regular rhythm.      Pulses: Normal pulses.      Heart sounds: Normal heart sounds.   Pulmonary:      Effort: Pulmonary effort is normal.      Breath sounds: Normal breath sounds.   Abdominal:      General: Bowel sounds are normal.      Palpations: Abdomen is soft.   Musculoskeletal:         General: Normal range of motion.      Cervical back: Normal range of motion.   Skin:     General: Skin is warm.    Neurological:      General: No focal deficit present.      Mental Status: He is alert and oriented to person, place, and time.   Psychiatric:         Mood and Affect: Mood normal.         Behavior: Behavior normal.         Thought Content: Thought content normal.         Judgment: Judgment normal.         IPSS Questionnaire (AUA-7):  IPSS Questionnaire (AUA-7):                  IPSS Questionnaire (AUA-7):  Over the past month…    1)  Incomplete Emptying  How often have you had a sensation of not emptying your bladder?  0 - Not at all   2)  Frequency  How often have you had to urinate less than every two hours? 5 - Almost always   3)  Intermittency  How often have you found you stopped and started again several times when you urinated?  0 - Not at all   4) Urgency  How often have you found it difficult to postpone urination?  0 - Not at all   5) Weak Stream  How often have you had a weak urinary stream?  0 - Not at all   6) Straining  How often have you had to push or strain to begin urination?  0 - Not at all   7) Nocturia  How many times did you typically get up at night to urinate?  3 - 3 times   Total Score:  8   The International Prostate Symptom Score (IPSS) is used to screen, diagnose, track symptoms of benign prostatic hyperplasia (BPH).    0-7 pts (Mild Symptoms)  / 8-19 pts (Moderate) / 20-35 (Severe)    Quality of life due to urinary symptoms:  If you were to spend the rest of your life with your urinary condition the way it is now, how would you feel about that? 4-Mostly Dissatisfied   Urine Leakage (Incontinence) 2-Mild (More than a few drops a day, 1-2 pads/day)       Recent Image (CT and/or KUB):   CT Abdomen and Pelvis: No results found for this or any previous visit.     CT Stone Protocol: No results found for this or any previous visit.     KUB: Results for orders placed during the hospital encounter of 05/25/21    XR Abdomen KUB    Narrative  EXAM:  XR Abdomen, 1 View    EXAM DATE:  5/25/2021  3:56 PM    CLINICAL HISTORY:  BILATERAL FLANK PAIN; R31.0-Gross hematuria; R10.9-Unspecified  abdominal pain    TECHNIQUE:  Frontal supine view of the abdomen/pelvis.    COMPARISON:  No relevant prior studies available.    FINDINGS:  GASTROINTESTINAL TRACT:  Abundant stool throughout the colon.  No  dilation.  BONES/JOINTS:  Unremarkable.    Impression  Abundant stool throughout the colon.    This report was finalized on 5/26/2021 8:49 AM by Dr. Ayden Nicole MD.       Labs:  Brief Urine Lab Results  (Last result in the past 365 days)      Color   Clarity   Blood   Leuk Est   Nitrite   Protein   CREAT   Urine HCG        12/07/22 1351 Yellow   Cloudy   3+   Large (3+)   Positive   1+               Office Visit on 12/07/2022   Component Date Value Ref Range Status   • Color 12/07/2022 Yellow  Yellow, Straw, Dark Yellow, Marlene Final   • Clarity, UA 12/07/2022 Cloudy (A)  Clear Final   • Specific Gravity  12/07/2022 1.025  1.005 - 1.030 Final   • pH, Urine 12/07/2022 6.0  5.0 - 8.0 Final   • Leukocytes 12/07/2022 Large (3+) (A)  Negative Final   • Nitrite, UA 12/07/2022 Positive (A)  Negative Final   • Protein, POC 12/07/2022 1+ (A)  Negative mg/dL Final   • Glucose, UA 12/07/2022 Negative  Negative mg/dL Final   • Ketones, UA 12/07/2022 Negative  Negative Final   • Urobilinogen, UA 12/07/2022 Normal  Normal, 0.2 E.U./dL Final   • Bilirubin 12/07/2022 Negative  Negative Final   • Blood, UA 12/07/2022 3+ (A)  Negative Final   • Lot Number 12/07/2022 [id number]   Final   • Expiration Date 12/07/2022 2/8/24   Final        Procedure: None  Procedures     I have reviewed and agree with the above PMH, PSH, FMH, social history, medications, allergies, and labs.     Assessment/Plan:   Problem List Items Addressed This Visit        Genitourinary and Reproductive     Frequency of micturition - Primary    Relevant Medications    tamsulosin (FLOMAX) 0.4 MG capsule 24 hr capsule    Other Relevant Orders    Urine Culture - Urine,  Urine, Clean Catch    Urinary tract infection with hematuria    Relevant Medications    nitrofurantoin, macrocrystal-monohydrate, (Macrobid) 100 MG capsule       Health Maintenance:   Health Maintenance Due   Topic Date Due   • TDAP/TD VACCINES (1 - Tdap) Never done   • ZOSTER VACCINE (1 of 2) Never done   • Pneumococcal Vaccine 65+ (1 - PCV) Never done   • ANNUAL WELLNESS VISIT  Never done   • COVID-19 Vaccine (3 - Booster for Pfizer series) 06/26/2021        Smoking Counseling: He has never smoked or use smokeless tobacco.    Urine Incontinence: Patient reports that he is not currently experiencing any symptoms of urinary incontinence.    Patient was given instructions and counseling regarding his condition or for health maintenance advice. Please see specific information pulled into the AVS if appropriate.    Patient Education:   BPH/frequency - Benign Prostate Hypertrophy (BPH): Discussed the pathophysiology of BPH and obstruction.  We discussed the static and dynamic effects of BPH as well as using 5 alpha reductase inhibitors versus alpha blockade.  We discussed the indications for transurethral surgery as well and/ or other therapeutic options available including all of the newer techniques.  Patient is currently on Flomax once daily.  I discussed the use of anticholinergics/antispasmodics for frequency.  Discussed the risk and side effects of this medication.  Advised patient to discontinue medication if he begins to experience increased difficulty in urinating/urinary retention.  Urinary tract infection -patient's UA is positive for signs of infection today.  I will send off for culture for signs of growth.  I will start him on a short 5-day course of Macrobid.  Advised patient increase water intake to 2 to 3 L/day.  Also advised patient and wife to start a daily probiotic for growth and control normal urogenital vincent.  I will call patient with culture results if antibiotics need to be changed.  Otherwise,  he would like to follow-up in 3 months or sooner if needed.  Patient verbalizes understanding and agrees to plan of care.    Visit Diagnoses:    ICD-10-CM ICD-9-CM   1. Frequency of micturition  R35.0 788.41   2. Urinary tract infection with hematuria, site unspecified  N39.0 599.0    R31.9 599.70       Meds Ordered During Visit:  New Medications Ordered This Visit   Medications   • nitrofurantoin, macrocrystal-monohydrate, (Macrobid) 100 MG capsule     Sig: Take 1 capsule by mouth 2 (Two) Times a Day.     Dispense:  10 capsule     Refill:  0   • tamsulosin (FLOMAX) 0.4 MG capsule 24 hr capsule     Sig: Take 1 capsule by mouth Daily.     Dispense:  90 capsule     Refill:  3       Follow Up Appointment: 3 months  No follow-ups on file.      This document has been electronically signed by Michel Mendez PA-C   December 7, 2022 15:56 EST    Part of this note may be an electronic transcription/translation of spoken language to printed text using the Dragon Dictation System.

## 2022-12-09 DIAGNOSIS — N39.0 URINARY TRACT INFECTION WITH HEMATURIA, SITE UNSPECIFIED: ICD-10-CM

## 2022-12-09 DIAGNOSIS — R31.9 URINARY TRACT INFECTION WITH HEMATURIA, SITE UNSPECIFIED: ICD-10-CM

## 2022-12-09 LAB — BACTERIA SPEC AEROBE CULT: ABNORMAL

## 2022-12-09 RX ORDER — NITROFURANTOIN 25; 75 MG/1; MG/1
100 CAPSULE ORAL 2 TIMES DAILY
Qty: 10 CAPSULE | Refills: 0 | Status: SHIPPED | OUTPATIENT
Start: 2022-12-09

## 2023-04-27 ENCOUNTER — OFFICE VISIT (OUTPATIENT)
Dept: UROLOGY | Facility: CLINIC | Age: 88
End: 2023-04-27
Payer: MEDICARE

## 2023-04-27 VITALS
WEIGHT: 170 LBS | DIASTOLIC BLOOD PRESSURE: 73 MMHG | HEART RATE: 88 BPM | HEIGHT: 69 IN | SYSTOLIC BLOOD PRESSURE: 126 MMHG | BODY MASS INDEX: 25.18 KG/M2

## 2023-04-27 DIAGNOSIS — N39.0 URINARY TRACT INFECTION WITH HEMATURIA, SITE UNSPECIFIED: Primary | ICD-10-CM

## 2023-04-27 DIAGNOSIS — R31.9 URINARY TRACT INFECTION WITH HEMATURIA, SITE UNSPECIFIED: Primary | ICD-10-CM

## 2023-04-27 DIAGNOSIS — R35.0 FREQUENCY OF MICTURITION: ICD-10-CM

## 2023-04-27 DIAGNOSIS — R31.0 GROSS HEMATURIA: ICD-10-CM

## 2023-04-27 LAB
BILIRUB BLD-MCNC: ABNORMAL MG/DL
CLARITY, POC: CLEAR
COLOR UR: ABNORMAL
EXPIRATION DATE: ABNORMAL
GLUCOSE UR STRIP-MCNC: NEGATIVE MG/DL
KETONES UR QL: ABNORMAL
LEUKOCYTE EST, POC: ABNORMAL
Lab: ABNORMAL
NITRITE UR-MCNC: POSITIVE MG/ML
PH UR: 5.5 [PH] (ref 5–8)
PROT UR STRIP-MCNC: ABNORMAL MG/DL
RBC # UR STRIP: ABNORMAL /UL
SP GR UR: 1.03 (ref 1–1.03)
UROBILINOGEN UR QL: NORMAL

## 2023-04-27 PROCEDURE — 87077 CULTURE AEROBIC IDENTIFY: CPT

## 2023-04-27 PROCEDURE — 87086 URINE CULTURE/COLONY COUNT: CPT

## 2023-04-27 RX ORDER — CEFTRIAXONE 1 G/1
1 INJECTION, POWDER, FOR SOLUTION INTRAMUSCULAR; INTRAVENOUS ONCE
Status: COMPLETED | OUTPATIENT
Start: 2023-04-27 | End: 2023-04-27

## 2023-04-27 RX ORDER — NITROFURANTOIN 25; 75 MG/1; MG/1
100 CAPSULE ORAL 2 TIMES DAILY
Qty: 10 CAPSULE | Refills: 0 | Status: SHIPPED | OUTPATIENT
Start: 2023-04-27

## 2023-04-27 RX ORDER — TAMSULOSIN HYDROCHLORIDE 0.4 MG/1
1 CAPSULE ORAL DAILY
Qty: 90 CAPSULE | Refills: 3 | Status: SHIPPED | OUTPATIENT
Start: 2023-04-27

## 2023-04-27 RX ADMIN — CEFTRIAXONE 1 G: 1 INJECTION, POWDER, FOR SOLUTION INTRAMUSCULAR; INTRAVENOUS at 12:01

## 2023-04-27 NOTE — PROGRESS NOTES
"Chief Complaint:    Chief Complaint   Patient presents with   • Blood in Urine       Vital Signs:   /73 (BP Location: Left arm, Patient Position: Sitting)   Pulse 88   Ht 175.3 cm (69.02\")   Wt 77.1 kg (170 lb)   BMI 25.09 kg/m²   Body mass index is 25.09 kg/m².      HPI:  Hugo Garcia is a 89 y.o. male who presents today for follow up    History of Present Illness  Mr. Garcia presents to the clinic for evaluation of gross hematuria.  I last seen the patient in office in December where he had a urinary tract infection positive for greater than 100,000 colony-forming units per mL of Citrobacter koseri.  Patient was scheduled to follow-up with me in early March however no-show.  States that he has had persistent gross hematuria over the past week.  He also has not been taking Flomax which was prescribed to him at last office visit.  He denies fever, chills, dysuria, frequency, urgency, or urinary incontinence.  He is up roughly 3 times throughout the night.  UA today is positive for nitrites, 3+ leukocytes, and 3+ blood.  He does appear to have another urinary tract infection.  I will give him a shot of antibiotics in office and send this off for culture analysis.      Past Medical History:  Past Medical History:   Diagnosis Date   • BPH with urinary obstruction        Current Meds:  Current Outpatient Medications   Medication Sig Dispense Refill   • aspirin 81 MG EC tablet Take 1 tablet by mouth Every Other Day.     • simvastatin (ZOCOR) 10 MG tablet Take 1 tablet by mouth Every Night.     • tamsulosin (FLOMAX) 0.4 MG capsule 24 hr capsule Take 1 capsule by mouth Daily. 90 capsule 3   • nitrofurantoin, macrocrystal-monohydrate, (Macrobid) 100 MG capsule Take 1 capsule by mouth 2 (Two) Times a Day. 10 capsule 0   • ondansetron (Zofran) 4 MG tablet Take 1 tablet by mouth Every 12 (Twelve) Hours As Needed for Nausea. (Patient not taking: Reported on 4/27/2023) 20 tablet 0     No current facility-administered " medications for this visit.        Allergies:   No Known Allergies     Past Surgical History:  Past Surgical History:   Procedure Laterality Date   • PROSTATE SURGERY         Social History:  Social History     Socioeconomic History   • Marital status: Unknown   Tobacco Use   • Smoking status: Never   • Smokeless tobacco: Never   Vaping Use   • Vaping Use: Never used   Substance and Sexual Activity   • Alcohol use: No   • Drug use: No       Family History:  Family History   Problem Relation Age of Onset   • No Known Problems Father    • No Known Problems Mother        Review of Systems:  Review of Systems   Constitutional: Negative for chills, fatigue, fever and unexpected weight change.   Respiratory: Positive for cough. Negative for chest tightness, shortness of breath and wheezing.    Cardiovascular: Negative for chest pain and leg swelling.   Gastrointestinal: Negative for abdominal pain, constipation, diarrhea, nausea and vomiting.   Genitourinary: Positive for hematuria. Negative for difficulty urinating, dysuria, frequency and urgency.   Musculoskeletal: Negative for back pain and joint swelling.   Skin: Negative for rash.   Neurological: Negative for dizziness and headaches.   Psychiatric/Behavioral: Negative for confusion and suicidal ideas.       Physical Exam:  Physical Exam  Constitutional:       General: He is not in acute distress.     Appearance: Normal appearance.   HENT:      Head: Normocephalic and atraumatic.      Nose: Nose normal.      Mouth/Throat:      Mouth: Mucous membranes are moist.   Eyes:      Conjunctiva/sclera: Conjunctivae normal.   Cardiovascular:      Rate and Rhythm: Normal rate and regular rhythm.      Pulses: Normal pulses.      Heart sounds: Normal heart sounds.   Pulmonary:      Effort: Pulmonary effort is normal.      Breath sounds: Normal breath sounds.   Abdominal:      General: Bowel sounds are normal.      Palpations: Abdomen is soft.      Tenderness: There is no right CVA  tenderness or left CVA tenderness.   Genitourinary:     Penis: Normal.       Testes: Normal.   Musculoskeletal:         General: Normal range of motion.      Cervical back: Normal range of motion.   Skin:     General: Skin is warm.   Neurological:      General: No focal deficit present.      Mental Status: He is alert and oriented to person, place, and time.   Psychiatric:         Mood and Affect: Mood normal.         Behavior: Behavior normal.         Thought Content: Thought content normal.         Judgment: Judgment normal.       Recent Image (CT and/or KUB):   CT Abdomen and Pelvis: No results found for this or any previous visit.     CT Stone Protocol: No results found for this or any previous visit.     KUB: Results for orders placed during the hospital encounter of 05/25/21    XR Abdomen KUB    Narrative  EXAM:  XR Abdomen, 1 View    EXAM DATE:  5/25/2021 3:56 PM    CLINICAL HISTORY:  BILATERAL FLANK PAIN; R31.0-Gross hematuria; R10.9-Unspecified  abdominal pain    TECHNIQUE:  Frontal supine view of the abdomen/pelvis.    COMPARISON:  No relevant prior studies available.    FINDINGS:  GASTROINTESTINAL TRACT:  Abundant stool throughout the colon.  No  dilation.  BONES/JOINTS:  Unremarkable.    Impression  Abundant stool throughout the colon.    This report was finalized on 5/26/2021 8:49 AM by Dr. Ayden Nicole MD.       Labs:  Brief Urine Lab Results  (Last result in the past 365 days)      Color   Clarity   Blood   Leuk Est   Nitrite   Protein   CREAT   Urine HCG        04/27/23 1148 Red   Clear   3+   500 Irma/ul   Positive   3+               Office Visit on 04/27/2023   Component Date Value Ref Range Status   • Color 04/27/2023 Red (A)  Yellow, Straw, Dark Yellow, Marlene Final   • Clarity, UA 04/27/2023 Clear  Clear Final   • Specific Gravity  04/27/2023 1.030  1.005 - 1.030 Final   • pH, Urine 04/27/2023 5.5  5.0 - 8.0 Final   • Leukocytes 04/27/2023 500 Irma/ul (A)  Negative Final   • Nitrite, UA 04/27/2023  Positive (A)  Negative Final   • Protein, POC 04/27/2023 3+ (A)  Negative mg/dL Final   • Glucose, UA 04/27/2023 Negative  Negative mg/dL Final   • Ketones, UA 04/27/2023 1+ (A)  Negative Final   • Urobilinogen, UA 04/27/2023 Normal  Normal, 0.2 E.U./dL Final   • Bilirubin 04/27/2023 1 mg/dL (A)  Negative Final   • Blood, UA 04/27/2023 3+ (A)  Negative Final   • Lot Number 04/27/2023 [id number]   Final   • Expiration Date 04/27/2023 2,082,024   Final        Procedure: None  Procedures     I have reviewed and agree with the above PMH, PSH, FMH, social history, medications, allergies, and labs.      Assessment/Plan:   Problem List Items Addressed This Visit        Genitourinary and Reproductive     Frequency of micturition    Relevant Medications    tamsulosin (FLOMAX) 0.4 MG capsule 24 hr capsule    Urinary tract infection with hematuria - Primary    Relevant Medications    nitrofurantoin, macrocrystal-monohydrate, (Macrobid) 100 MG capsule    tamsulosin (FLOMAX) 0.4 MG capsule 24 hr capsule    cefTRIAXone (ROCEPHIN) injection 1 g (Completed)    Other Relevant Orders    POC Urinalysis Dipstick, Automated (Completed)    Urine Culture - Urine, Urine, Clean Catch   Other Visit Diagnoses     Gross hematuria        Relevant Medications    tamsulosin (FLOMAX) 0.4 MG capsule 24 hr capsule          Health Maintenance:   Health Maintenance Due   Topic Date Due   • URINE MICROALBUMIN  Never done   • Pneumococcal Vaccine 65+ (1 - PCV) Never done   • TDAP/TD VACCINES (1 - Tdap) Never done   • ZOSTER VACCINE (1 of 2) Never done   • ANNUAL WELLNESS VISIT  Never done   • HEMOGLOBIN A1C  Never done   • DIABETIC EYE EXAM  Never done   • COVID-19 Vaccine (3 - Booster for Pfizer series) 06/26/2021        Smoking Counseling: Never smoked or used smokeless tobacco    Urine Incontinence: Patient reports that he is not currently experiencing any symptoms of urinary incontinence.    Patient was given instructions and counseling  regarding his condition or for health maintenance advice. Please see specific information pulled into the AVS if appropriate.    Patient Education:   Gross hematuria/urinary tract infection - I discussed with the patient ways to help prevent urinary tract infection.  Advised patient to continue to increase water to 2 to 3 L/day.  Advised patient to take an over-the-counter probiotic to help with growth and control of normal urogenital vincent.  Discussed with the patient the use of a nightly antibiotic to help prevent urinary tract infections.  Seen the patient in December where he had acute urinary tract infection was.  Appropriately with antibiotics.  He was scheduled to follow-up in March however no showed his appointment.  His UA today is consistent with a urinary tract infection.  I will send this off for culture analysis.  I will give the patient a shot of Rocephin in office today and Steidle Macrobid twice daily for 5 days for infection prophylaxis over the weekend.  I also advised patient to begin Flomax once nightly to help with lower urinary tract symptoms.  I like to see the patient back in roughly a month to reevaluate symptoms.  Patient and wife verbalized understanding and agreed to plan of care.    Visit Diagnoses:    ICD-10-CM ICD-9-CM   1. Urinary tract infection with hematuria, site unspecified  N39.0 599.0    R31.9 599.70   2. Frequency of micturition  R35.0 788.41   3. Gross hematuria  R31.0 599.71       Meds Ordered During Visit:  New Medications Ordered This Visit   Medications   • nitrofurantoin, macrocrystal-monohydrate, (Macrobid) 100 MG capsule     Sig: Take 1 capsule by mouth 2 (Two) Times a Day.     Dispense:  10 capsule     Refill:  0   • tamsulosin (FLOMAX) 0.4 MG capsule 24 hr capsule     Sig: Take 1 capsule by mouth Daily.     Dispense:  90 capsule     Refill:  3   • cefTRIAXone (ROCEPHIN) injection 1 g       Follow Up Appointment: 1 month  No follow-ups on file.      This document has  been electronically signed by Michel Mendez PA-C   April 27, 2023 18:15 EDT    Part of this note may be an electronic transcription/translation of spoken language to printed text using the Dragon Dictation System.

## 2023-05-01 ENCOUNTER — TELEPHONE (OUTPATIENT)
Dept: UROLOGY | Facility: CLINIC | Age: 88
End: 2023-05-01
Payer: MEDICARE

## 2023-05-01 LAB — BACTERIA SPEC AEROBE CULT: ABNORMAL

## 2023-05-01 NOTE — TELEPHONE ENCOUNTER
I called pt with positive urine culture results.      ----- Message from Michel Mendez PA-C sent at 4/30/2023  7:30 PM EDT -----  Regarding: FW:  I gave the patient a shot of rocephin and started him on macrobid. This should help treat his infection. If his symptoms haven’t improved or he is still symptomatic he can start cefdinir every 12 hours for 7 days. Thanks.  ----- Message -----  From: Yvonne Kaur MA  Sent: 4/27/2023  11:50 AM EDT  To: Michel Mendez PA-C

## 2023-06-02 ENCOUNTER — OFFICE VISIT (OUTPATIENT)
Dept: UROLOGY | Facility: CLINIC | Age: 88
End: 2023-06-02

## 2023-06-02 VITALS
BODY MASS INDEX: 27.19 KG/M2 | DIASTOLIC BLOOD PRESSURE: 84 MMHG | HEART RATE: 75 BPM | HEIGHT: 69 IN | WEIGHT: 183.6 LBS | SYSTOLIC BLOOD PRESSURE: 153 MMHG

## 2023-06-02 DIAGNOSIS — R31.9 URINARY TRACT INFECTION WITH HEMATURIA, SITE UNSPECIFIED: Primary | ICD-10-CM

## 2023-06-02 DIAGNOSIS — N39.0 URINARY TRACT INFECTION WITH HEMATURIA, SITE UNSPECIFIED: Primary | ICD-10-CM

## 2023-06-02 LAB
BILIRUB BLD-MCNC: NEGATIVE MG/DL
CLARITY, POC: CLEAR
COLOR UR: YELLOW
EXPIRATION DATE: ABNORMAL
GLUCOSE UR STRIP-MCNC: NEGATIVE MG/DL
KETONES UR QL: NEGATIVE
LEUKOCYTE EST, POC: ABNORMAL
Lab: ABNORMAL
NITRITE UR-MCNC: NEGATIVE MG/ML
PH UR: 5 [PH] (ref 5–8)
PROT UR STRIP-MCNC: ABNORMAL MG/DL
RBC # UR STRIP: NEGATIVE /UL
SP GR UR: 1.01 (ref 1–1.03)
UROBILINOGEN UR QL: NORMAL

## 2023-06-02 PROCEDURE — 87086 URINE CULTURE/COLONY COUNT: CPT

## 2023-06-02 NOTE — PROGRESS NOTES
"Chief Complaint:    Chief Complaint   Patient presents with   • Urinary tract infection / frequency     Follow up       Vital Signs:   /84   Pulse 75   Ht 175.3 cm (69.02\")   Wt 83.3 kg (183 lb 9.6 oz)   BMI 27.10 kg/m²   Body mass index is 27.1 kg/m².      HPI:  Hugo Garcia is a 89 y.o. male who presents today for follow up    History of Present Illness  Mr. Garcia presents to the clinic for follow-up for frequency and urinary tract infection.  I last saw the patient in office roughly 1 month ago for an acute urinary tract infection.  His UA at that time did show positive nitrites, 1+ leukocytes, and 3+ blood.  He was given a shot of Rocephin in office and started on Macrobid twice daily for 5 days.  His urine culture did come back positive with >100,000 CFU/mL Aerococcus urinae.  Patient reports improvement in symptoms since finishing antibiotics.  UA today shows only 1+ leukocytes with negative nitrites and no signs of microscopic/gross blood. He and his wife denies any other significant significant problems at this time including but not limited to fever, chills, gross hematuria, altered mental status, or increase in difficulty urinating..  His blood pressure was elevated in office today on initial read.  I did retake it and it slightly decreased to 153/84.      Past Medical History:  Past Medical History:   Diagnosis Date   • BPH with urinary obstruction        Current Meds:  Current Outpatient Medications   Medication Sig Dispense Refill   • aspirin 81 MG EC tablet Take 1 tablet by mouth Every Other Day.     • nitrofurantoin, macrocrystal-monohydrate, (Macrobid) 100 MG capsule Take 1 capsule by mouth 2 (Two) Times a Day. 10 capsule 0   • ondansetron (Zofran) 4 MG tablet Take 1 tablet by mouth Every 12 (Twelve) Hours As Needed for Nausea. 20 tablet 0   • simvastatin (ZOCOR) 10 MG tablet Take 1 tablet by mouth Every Night.     • tamsulosin (FLOMAX) 0.4 MG capsule 24 hr capsule Take 1 capsule by mouth " Daily. 90 capsule 3     No current facility-administered medications for this visit.        Allergies:   No Known Allergies     Past Surgical History:  Past Surgical History:   Procedure Laterality Date   • PROSTATE SURGERY         Social History:  Social History     Socioeconomic History   • Marital status: Unknown   Tobacco Use   • Smoking status: Never   • Smokeless tobacco: Never   Vaping Use   • Vaping Use: Never used   Substance and Sexual Activity   • Alcohol use: No   • Drug use: No       Family History:  Family History   Problem Relation Age of Onset   • No Known Problems Father    • No Known Problems Mother        Review of Systems:  Review of Systems   Constitutional: Negative for chills, fatigue, fever and unexpected weight change.   Respiratory: Positive for cough. Negative for chest tightness, shortness of breath and wheezing.    Cardiovascular: Negative for chest pain and leg swelling.   Gastrointestinal: Negative for abdominal pain, constipation, diarrhea, nausea and vomiting.   Genitourinary: Negative for difficulty urinating, dysuria, frequency, hematuria and urgency.   Musculoskeletal: Negative for back pain and joint swelling.   Skin: Negative for rash.   Neurological: Negative for dizziness and headaches.   Psychiatric/Behavioral: Negative for confusion and suicidal ideas.       Physical Exam:  Physical Exam  Constitutional:       General: He is not in acute distress.     Appearance: Normal appearance.   HENT:      Head: Normocephalic and atraumatic.      Nose: Nose normal.      Mouth/Throat:      Mouth: Mucous membranes are moist.   Eyes:      Conjunctiva/sclera: Conjunctivae normal.   Cardiovascular:      Rate and Rhythm: Normal rate and regular rhythm.      Pulses: Normal pulses.      Heart sounds: Normal heart sounds.   Pulmonary:      Effort: Pulmonary effort is normal.      Breath sounds: Normal breath sounds.   Abdominal:      General: Bowel sounds are normal.      Palpations: Abdomen is  soft.      Tenderness: There is no right CVA tenderness or left CVA tenderness.   Musculoskeletal:         General: Normal range of motion.      Cervical back: Normal range of motion.   Skin:     General: Skin is warm.   Neurological:      General: No focal deficit present.      Mental Status: He is alert and oriented to person, place, and time.   Psychiatric:         Mood and Affect: Mood normal.         Behavior: Behavior normal.         Thought Content: Thought content normal.         Judgment: Judgment normal.       Recent Image (CT and/or KUB):   CT Abdomen and Pelvis: No results found for this or any previous visit.     CT Stone Protocol: No results found for this or any previous visit.     KUB: Results for orders placed during the hospital encounter of 05/25/21    XR Abdomen KUB    Narrative  EXAM:  XR Abdomen, 1 View    EXAM DATE:  5/25/2021 3:56 PM    CLINICAL HISTORY:  BILATERAL FLANK PAIN; R31.0-Gross hematuria; R10.9-Unspecified  abdominal pain    TECHNIQUE:  Frontal supine view of the abdomen/pelvis.    COMPARISON:  No relevant prior studies available.    FINDINGS:  GASTROINTESTINAL TRACT:  Abundant stool throughout the colon.  No  dilation.  BONES/JOINTS:  Unremarkable.    Impression  Abundant stool throughout the colon.    This report was finalized on 5/26/2021 8:49 AM by Dr. Ayden Nicole MD.       Labs:  Brief Urine Lab Results  (Last result in the past 365 days)      Color   Clarity   Blood   Leuk Est   Nitrite   Protein   CREAT   Urine HCG        06/02/23 1439 Yellow   Clear   Negative   Small (1+)   Negative   Trace               Office Visit on 06/02/2023   Component Date Value Ref Range Status   • Color 06/02/2023 Yellow  Yellow, Straw, Dark Yellow, Marlene Final   • Clarity, UA 06/02/2023 Clear  Clear Final   • Specific Gravity  06/02/2023 1.015  1.005 - 1.030 Final   • pH, Urine 06/02/2023 5.0  5.0 - 8.0 Final   • Leukocytes 06/02/2023 Small (1+) (A)  Negative Final   • Nitrite, UA 06/02/2023  Negative  Negative Final   • Protein, POC 06/02/2023 Trace (A)  Negative mg/dL Final   • Glucose, UA 06/02/2023 Negative  Negative mg/dL Final   • Ketones, UA 06/02/2023 Negative  Negative Final   • Urobilinogen, UA 06/02/2023 Normal  Normal, 0.2 E.U./dL Final   • Bilirubin 06/02/2023 Negative  Negative Final   • Blood, UA 06/02/2023 Negative  Negative Final   • Lot Number 06/02/2023 [id number]   Final   • Expiration Date 06/02/2023 10/25/2024   Final   Office Visit on 04/27/2023   Component Date Value Ref Range Status   • Color 04/27/2023 Red (A)  Yellow, Straw, Dark Yellow, Marlene Final   • Clarity, UA 04/27/2023 Clear  Clear Final   • Specific Gravity  04/27/2023 1.030  1.005 - 1.030 Final   • pH, Urine 04/27/2023 5.5  5.0 - 8.0 Final   • Leukocytes 04/27/2023 500 Irma/ul (A)  Negative Final   • Nitrite, UA 04/27/2023 Positive (A)  Negative Final   • Protein, POC 04/27/2023 3+ (A)  Negative mg/dL Final   • Glucose, UA 04/27/2023 Negative  Negative mg/dL Final   • Ketones, UA 04/27/2023 1+ (A)  Negative Final   • Urobilinogen, UA 04/27/2023 Normal  Normal, 0.2 E.U./dL Final   • Bilirubin 04/27/2023 1 mg/dL (A)  Negative Final   • Blood, UA 04/27/2023 3+ (A)  Negative Final   • Lot Number 04/27/2023 [id number]   Final   • Expiration Date 04/27/2023 2,082,024   Final   • Urine Culture 04/27/2023 >100,000 CFU/mL Aerococcus urinae (A)   Final      Aerococcus spp. are usually susceptible to beta-lactams and vancomycin. Resistance has been reported to the fluoroquinolones. Routine susceptibility testing is not recommended. Please call lab to request further workup.        Procedure: None  Procedures     I have reviewed and agree with the above PMH, PSH, FMH, social history, medications, allergies, and labs.      Assessment/Plan:   Problem List Items Addressed This Visit        Genitourinary and Reproductive     Urinary tract infection with hematuria - Primary    Relevant Orders    POC Urinalysis Dipstick,  Automated (Completed)    Urine Culture - Urine, Urine, Clean Catch       Health Maintenance:   Health Maintenance Due   Topic Date Due   • URINE MICROALBUMIN  Never done   • Pneumococcal Vaccine 65+ (1 - PCV) Never done   • TDAP/TD VACCINES (1 - Tdap) Never done   • ZOSTER VACCINE (1 of 2) Never done   • ANNUAL WELLNESS VISIT  Never done   • HEMOGLOBIN A1C  Never done   • DIABETIC EYE EXAM  Never done   • COVID-19 Vaccine (3 - Booster for Pfizer series) 06/26/2021        Smoking Counseling: Never smoked or used smokeless tobacco    Urine Incontinence: Patient reports that he is not currently experiencing any symptoms of urinary incontinence.    Patient was given instructions and counseling regarding his condition or for health maintenance advice. Please see specific information pulled into the AVS if appropriate.    Patient Education:   Urinary tract infection -patient's last urine culture came back positive with infection and was treated appropriately with antibiotics and has resolution of symptoms.  UA today shows only 1+ leukocytes and is improved from last office visit.  I will send the patient's urine back off for culture analysis.  Advised patient that if he continues to have recurrent urinary tract infections we will need to start him on a nightly antibiotic such as Macrobid.  Given the patient is has resolution of symptoms at this time recommending observation.  I will call him with culture results once obtained.  Advised him to continue to increase water intake to 2 to 3 L/day and continue with daily probiotic.  BPH -patient denies any significant lower urinary tract symptoms at this time.  He is also not taking his Flomax as prescribed per his wife.  Advised that if he begins to experience an increase in difficulty urinating he should continue Flomax once daily.  Advised him return to the clinic if they have any worsening symptoms or concerns.  Otherwise I will see him back in 3 months pending urine  culture.  Patient and family verbalized understanding.    Visit Diagnoses:    ICD-10-CM ICD-9-CM   1. Urinary tract infection with hematuria, site unspecified  N39.0 599.0    R31.9 599.70       Meds Ordered During Visit:  No orders of the defined types were placed in this encounter.      Follow Up Appointment: 3 months  No follow-ups on file.      This document has been electronically signed by Michel Mendez PA-C   June 2, 2023 15:21 EDT    Part of this note may be an electronic transcription/translation of spoken language to printed text using the Dragon Dictation System.

## 2023-06-03 LAB — BACTERIA SPEC AEROBE CULT: NO GROWTH

## 2023-08-31 ENCOUNTER — OFFICE VISIT (OUTPATIENT)
Dept: UROLOGY | Facility: CLINIC | Age: 88
End: 2023-08-31
Payer: MEDICARE

## 2023-08-31 VITALS
BODY MASS INDEX: 27.7 KG/M2 | HEIGHT: 69 IN | DIASTOLIC BLOOD PRESSURE: 81 MMHG | WEIGHT: 187 LBS | SYSTOLIC BLOOD PRESSURE: 156 MMHG | HEART RATE: 116 BPM

## 2023-08-31 DIAGNOSIS — R31.9 URINARY TRACT INFECTION WITH HEMATURIA, SITE UNSPECIFIED: Primary | ICD-10-CM

## 2023-08-31 DIAGNOSIS — N39.0 URINARY TRACT INFECTION WITH HEMATURIA, SITE UNSPECIFIED: Primary | ICD-10-CM

## 2023-08-31 LAB
BILIRUB BLD-MCNC: NEGATIVE MG/DL
CLARITY, POC: CLEAR
COLOR UR: ABNORMAL
EXPIRATION DATE: ABNORMAL
GLUCOSE UR STRIP-MCNC: NEGATIVE MG/DL
KETONES UR QL: NEGATIVE
LEUKOCYTE EST, POC: ABNORMAL
Lab: ABNORMAL
NITRITE UR-MCNC: POSITIVE MG/ML
PH UR: 5 [PH] (ref 5–8)
PROT UR STRIP-MCNC: ABNORMAL MG/DL
RBC # UR STRIP: ABNORMAL /UL
SP GR UR: 1.02 (ref 1–1.03)
UROBILINOGEN UR QL: NORMAL

## 2023-08-31 PROCEDURE — 87086 URINE CULTURE/COLONY COUNT: CPT

## 2023-08-31 PROCEDURE — 80053 COMPREHEN METABOLIC PANEL: CPT

## 2023-08-31 NOTE — PROGRESS NOTES
"Chief Complaint:    Chief Complaint   Patient presents with     Urinary tract infection / frequency    Blood in Urine       Vital Signs:   /81 (BP Location: Right arm, Patient Position: Sitting)   Pulse 116   Ht 175.3 cm (69.02\")   Wt 84.8 kg (187 lb)   BMI 27.60 kg/mý   Body mass index is 27.6 kg/mý.      HPI:  Hugo Garcia is a 89 y.o. male who presents today for follow up    History of Present Illness  Mr. Garcia presents to the clinic for follow-up for recurrent urinary tract infection.  He has a past medical history significant for BPH with urinary obstruction.  He has been seen in the office several times since December and had several positive urine cultures with E. coli and Citrobacter fundi.  His last urine culture in June came back with no growth.  His daughter and wife accompany him in office today.  They report he had gross hematuria over the past 2 to 3 days.  His urine analysis today is positive for nitrites, 3+ leukocytes, 3+ blood, and 2+ protein.  They also report a change in mental status.  Patient denies any difficulty urinating or decreased urinary output.  I will give the patient a shot of antibiotics in office today and complete a CMP to evaluate kidney function.  I will most likely start him on Macrobid prophylactically if kidney function is normal.  Otherwise we will see him back in roughly 3 months.  We none will call patient with culture results once obtained.    Past Medical History:  Past Medical History:   Diagnosis Date    BPH with urinary obstruction        Current Meds:  Current Outpatient Medications   Medication Sig Dispense Refill    aspirin 81 MG EC tablet Take 1 tablet by mouth Every Other Day.      simvastatin (ZOCOR) 10 MG tablet Take 1 tablet by mouth Every Night.      nitrofurantoin, macrocrystal-monohydrate, (Macrobid) 100 MG capsule Take 1 capsule by mouth 2 (Two) Times a Day. (Patient not taking: Reported on 8/31/2023) 10 capsule 0    ondansetron (Zofran) 4 MG " tablet Take 1 tablet by mouth Every 12 (Twelve) Hours As Needed for Nausea. (Patient not taking: Reported on 8/31/2023) 20 tablet 0    tamsulosin (FLOMAX) 0.4 MG capsule 24 hr capsule Take 1 capsule by mouth Daily. (Patient not taking: Reported on 8/31/2023) 90 capsule 3     No current facility-administered medications for this visit.        Allergies:   No Known Allergies     Past Surgical History:  Past Surgical History:   Procedure Laterality Date    PROSTATE SURGERY         Social History:  Social History     Socioeconomic History    Marital status: Unknown   Tobacco Use    Smoking status: Never    Smokeless tobacco: Never   Vaping Use    Vaping Use: Never used   Substance and Sexual Activity    Alcohol use: No    Drug use: No    Sexual activity: Never       Family History:  Family History   Problem Relation Age of Onset    No Known Problems Father     No Known Problems Mother        Review of Systems:  Review of Systems   Constitutional:  Negative for chills, fatigue and fever.   Respiratory:  Negative for cough, shortness of breath and wheezing.    Cardiovascular:  Negative for leg swelling.   Gastrointestinal:  Negative for abdominal pain, nausea and vomiting.   Genitourinary:  Positive for hematuria. Negative for difficulty urinating, dysuria, frequency and urgency.   Musculoskeletal:  Positive for back pain. Negative for joint swelling.   Skin: Negative.    Neurological:  Negative for dizziness and headaches.   Psychiatric/Behavioral:  Positive for confusion.      Physical Exam:  Physical Exam  Constitutional:       General: He is not in acute distress.     Appearance: Normal appearance.   HENT:      Head: Normocephalic and atraumatic.      Nose: Nose normal.      Mouth/Throat:      Mouth: Mucous membranes are moist.   Eyes:      Conjunctiva/sclera: Conjunctivae normal.   Cardiovascular:      Rate and Rhythm: Normal rate and regular rhythm.      Pulses: Normal pulses.      Heart sounds: Normal heart sounds.    Pulmonary:      Effort: Pulmonary effort is normal.      Breath sounds: Normal breath sounds.   Abdominal:      General: Bowel sounds are normal.      Palpations: Abdomen is soft.      Tenderness: There is no right CVA tenderness or left CVA tenderness.   Musculoskeletal:         General: Normal range of motion.      Cervical back: Normal range of motion.   Skin:     General: Skin is warm.   Neurological:      General: No focal deficit present.      Mental Status: He is alert and oriented to person, place, and time.   Psychiatric:         Mood and Affect: Mood normal.         Behavior: Behavior normal.         Thought Content: Thought content normal.         Judgment: Judgment normal.           Recent Image (CT and/or KUB):   CT Abdomen and Pelvis: No results found for this or any previous visit.     CT Stone Protocol: No results found for this or any previous visit.     KUB: Results for orders placed during the hospital encounter of 05/25/21    XR Abdomen KUB    Narrative  EXAM:  XR Abdomen, 1 View    EXAM DATE:  5/25/2021 3:56 PM    CLINICAL HISTORY:  BILATERAL FLANK PAIN; R31.0-Gross hematuria; R10.9-Unspecified  abdominal pain    TECHNIQUE:  Frontal supine view of the abdomen/pelvis.    COMPARISON:  No relevant prior studies available.    FINDINGS:  GASTROINTESTINAL TRACT:  Abundant stool throughout the colon.  No  dilation.  BONES/JOINTS:  Unremarkable.    Impression  Abundant stool throughout the colon.    This report was finalized on 5/26/2021 8:49 AM by Dr. Ayden Nicole MD.       Labs:  Brief Urine Lab Results  (Last result in the past 365 days)        Color   Clarity   Blood   Leuk Est   Nitrite   Protein   CREAT   Urine HCG        08/31/23 1321 Red   Clear   3+   500 Irma/ul   Positive   3+                 Office Visit on 08/31/2023   Component Date Value Ref Range Status    Color 08/31/2023 Red (A)  Yellow, Straw, Dark Yellow, Marlene Final    Clarity, UA 08/31/2023 Clear  Clear Final    Specific Gravity   08/31/2023 1.025  1.005 - 1.030 Final    pH, Urine 08/31/2023 5.0  5.0 - 8.0 Final    Leukocytes 08/31/2023 500 Irma/ul (A)  Negative Final    Nitrite, UA 08/31/2023 Positive (A)  Negative Final    Protein, POC 08/31/2023 3+ (A)  Negative mg/dL Final    Glucose, UA 08/31/2023 Negative  Negative mg/dL Final    Ketones, UA 08/31/2023 Negative  Negative Final    Urobilinogen, UA 08/31/2023 Normal  Normal, 0.2 E.U./dL Final    Bilirubin 08/31/2023 Negative  Negative Final    Blood, UA 08/31/2023 3+ (A)  Negative Final    Lot Number 08/31/2023 [id number]   Final    Expiration Date 08/31/2023 102,024   Final   Office Visit on 06/02/2023   Component Date Value Ref Range Status    Color 06/02/2023 Yellow  Yellow, Straw, Dark Yellow, Marlene Final    Clarity, UA 06/02/2023 Clear  Clear Final    Specific Gravity  06/02/2023 1.015  1.005 - 1.030 Final    pH, Urine 06/02/2023 5.0  5.0 - 8.0 Final    Leukocytes 06/02/2023 Small (1+) (A)  Negative Final    Nitrite, UA 06/02/2023 Negative  Negative Final    Protein, POC 06/02/2023 Trace (A)  Negative mg/dL Final    Glucose, UA 06/02/2023 Negative  Negative mg/dL Final    Ketones, UA 06/02/2023 Negative  Negative Final    Urobilinogen, UA 06/02/2023 Normal  Normal, 0.2 E.U./dL Final    Bilirubin 06/02/2023 Negative  Negative Final    Blood, UA 06/02/2023 Negative  Negative Final    Lot Number 06/02/2023 [id number]   Final    Expiration Date 06/02/2023 10/25/2024   Final    Urine Culture 06/02/2023 No growth   Final        Procedure: None  Procedures     I have reviewed and agree with the above PMH, PSH, FMH, social history, medications, allergies, and labs.     Assessment/Plan:   Problem List Items Addressed This Visit          Genitourinary and Reproductive     Urinary tract infection with hematuria - Primary    Relevant Orders    POC Urinalysis Dipstick, Automated (Completed)    Urine Culture - Urine, Urine, Clean Catch    Comprehensive Metabolic Panel       Health  Maintenance:   Health Maintenance Due   Topic Date Due    URINE MICROALBUMIN  Never done    BMI FOLLOWUP  Never done    Pneumococcal Vaccine 65+ (1 - PCV) Never done    TDAP/TD VACCINES (1 - Tdap) Never done    ZOSTER VACCINE (1 of 2) Never done    ANNUAL WELLNESS VISIT  Never done    HEMOGLOBIN A1C  Never done    DIABETIC EYE EXAM  Never done    COVID-19 Vaccine (3 - Pfizer series) 06/26/2021        Smoking Counseling: Never smoked or used smokeless tobacco.  Counseling given.    Urine Incontinence: Patient reports that he is not currently experiencing any symptoms of urinary incontinence.    Patient was given instructions and counseling regarding his condition or for health maintenance advice. Please see specific information pulled into the AVS if appropriate.    Patient Education:   Urinary tract infection -patient has recurrent urinary tract infections and urine culture is positive for nitrites, leukocytes, and microscopic blood in office today.  There is slight gross hematuria noted.  We will give the patient a shot of antibiotics in office today for infection prophylaxis and will complete a CMP to monitor for kidney function.  I will call the patient and possibly prophylactic Macrobid 100 mg twice daily for 5 days pending culture results.  We will give the patient a call on Monday with culture results if antibiotics need to be changed.  Advised him to increase water intake to 2 to 3 L/day.  Advised him to take Flomax as needed to help with incomplete bladder emptying.  Advised patient to take a daily probiotic as well.  Educated patient and family to go to the ER if symptoms worsen or he begins to experience fever, chills, nausea, vomiting, gross hematuria that does not stop, severe back pain, or altered mental status.  Patient and family verbalized understanding.    Visit Diagnoses:    ICD-10-CM ICD-9-CM   1. Urinary tract infection with hematuria, site unspecified  N39.0 599.0    R31.9 599.70       Meds  Ordered During Visit:  No orders of the defined types were placed in this encounter.      Follow Up Appointment: 3 months  No follow-ups on file.      This document has been electronically signed by Michel Mendez PA-C   August 31, 2023 14:27 EDT    Part of this note may be an electronic transcription/translation of spoken language to printed text using the Dragon Dictation System.

## 2023-09-01 ENCOUNTER — TELEPHONE (OUTPATIENT)
Dept: UROLOGY | Facility: CLINIC | Age: 88
End: 2023-09-01
Payer: MEDICARE

## 2023-09-01 DIAGNOSIS — N39.0 URINARY TRACT INFECTION WITH HEMATURIA, SITE UNSPECIFIED: Primary | ICD-10-CM

## 2023-09-01 DIAGNOSIS — R31.9 URINARY TRACT INFECTION WITH HEMATURIA, SITE UNSPECIFIED: Primary | ICD-10-CM

## 2023-09-01 LAB
ALBUMIN SERPL-MCNC: 4.1 G/DL (ref 3.5–5.2)
ALBUMIN/GLOB SERPL: 1.1 G/DL
ALP SERPL-CCNC: 80 U/L (ref 39–117)
ALT SERPL W P-5'-P-CCNC: 20 U/L (ref 1–41)
ANION GAP SERPL CALCULATED.3IONS-SCNC: 12.9 MMOL/L (ref 5–15)
AST SERPL-CCNC: 21 U/L (ref 1–40)
BACTERIA SPEC AEROBE CULT: NO GROWTH
BILIRUB SERPL-MCNC: 0.5 MG/DL (ref 0–1.2)
BUN SERPL-MCNC: 21 MG/DL (ref 8–23)
BUN/CREAT SERPL: 18.3 (ref 7–25)
CALCIUM SPEC-SCNC: 9.8 MG/DL (ref 8.2–9.6)
CHLORIDE SERPL-SCNC: 103 MMOL/L (ref 98–107)
CO2 SERPL-SCNC: 23.1 MMOL/L (ref 22–29)
CREAT SERPL-MCNC: 1.15 MG/DL (ref 0.76–1.27)
EGFRCR SERPLBLD CKD-EPI 2021: 60.5 ML/MIN/1.73
GLOBULIN UR ELPH-MCNC: 3.7 GM/DL
GLUCOSE SERPL-MCNC: 157 MG/DL (ref 65–99)
POTASSIUM SERPL-SCNC: 4.7 MMOL/L (ref 3.5–5.2)
PROT SERPL-MCNC: 7.8 G/DL (ref 6–8.5)
SODIUM SERPL-SCNC: 139 MMOL/L (ref 136–145)

## 2023-09-01 RX ORDER — NITROFURANTOIN 25; 75 MG/1; MG/1
100 CAPSULE ORAL EVERY 12 HOURS
Qty: 10 CAPSULE | Refills: 0 | Status: SHIPPED | OUTPATIENT
Start: 2023-09-01

## 2023-09-01 NOTE — TELEPHONE ENCOUNTER
Called Mr. Garcia to inform him of the kidney CMP results.  Advised that everything came back great.  I will send in Macrobid 100 mg twice daily prophylactically for his UTI for the next 5 days.  We will call with culture results on Monday once obtained.

## 2023-11-30 ENCOUNTER — OFFICE VISIT (OUTPATIENT)
Dept: UROLOGY | Facility: CLINIC | Age: 88
End: 2023-11-30
Payer: MEDICARE

## 2023-11-30 VITALS
DIASTOLIC BLOOD PRESSURE: 83 MMHG | BODY MASS INDEX: 27.6 KG/M2 | HEIGHT: 69 IN | SYSTOLIC BLOOD PRESSURE: 151 MMHG | HEART RATE: 109 BPM

## 2023-11-30 DIAGNOSIS — R35.1 BENIGN PROSTATIC HYPERPLASIA WITH NOCTURIA: Primary | ICD-10-CM

## 2023-11-30 DIAGNOSIS — R31.9 URINARY TRACT INFECTION WITH HEMATURIA, SITE UNSPECIFIED: ICD-10-CM

## 2023-11-30 DIAGNOSIS — N39.0 URINARY TRACT INFECTION WITH HEMATURIA, SITE UNSPECIFIED: ICD-10-CM

## 2023-11-30 DIAGNOSIS — N40.1 BENIGN PROSTATIC HYPERPLASIA WITH NOCTURIA: Primary | ICD-10-CM

## 2023-11-30 RX ORDER — NITROFURANTOIN 25; 75 MG/1; MG/1
100 CAPSULE ORAL EVERY 12 HOURS
Qty: 10 CAPSULE | Refills: 0 | Status: SHIPPED | OUTPATIENT
Start: 2023-11-30

## 2023-11-30 NOTE — PROGRESS NOTES
"Chief Complaint:    Chief Complaint   Patient presents with    Urinary Tract Infection    Urinary Frequency       Vital Signs:   /83 (BP Location: Right arm, Patient Position: Sitting)   Pulse 109   Ht 175.3 cm (69.02\")   BMI 27.60 kg/m²   Body mass index is 27.6 kg/m².      HPI:  Hugo Garcia is a 89 y.o. male who presents today for follow up    History of Present Illness  Mr. Garcia presents to the clinic for follow-up for urinary tract infection and BPH with obstruction.  He was last seen in office in August of this year.  He had a urine completed at that time that showed positive nitrites and 3+ leukocytes with 3+ blood.  I did send this off for culture and it showed no growth.  He continues with Flomax 1 capsule by mouth every 48 hours.  He does have a medical history of dementia and history is mainly given by daughter and wife in office today.  They do report that he had some difficulty with ambulation.  States he has been using his cane more and seems more unsteady on his feet.  Patient denies any gross hematuria.  He gets up roughly 2-3 times throughout the night.  Patient was unable to urinate in office.  Bladder scan 0 mL.      Past Medical History:  Past Medical History:   Diagnosis Date    BPH with urinary obstruction        Current Meds:  Current Outpatient Medications   Medication Sig Dispense Refill    aspirin 81 MG EC tablet Take 1 tablet by mouth Every Other Day.      nitrofurantoin, macrocrystal-monohydrate, (Macrobid) 100 MG capsule Take 1 capsule by mouth Every 12 (Twelve) Hours. 10 capsule 0    ondansetron (Zofran) 4 MG tablet Take 1 tablet by mouth Every 12 (Twelve) Hours As Needed for Nausea. 20 tablet 0    simvastatin (ZOCOR) 10 MG tablet Take 1 tablet by mouth Every Night.      tamsulosin (FLOMAX) 0.4 MG capsule 24 hr capsule Take 1 capsule by mouth Daily. 90 capsule 3     No current facility-administered medications for this visit.        Allergies:   No Known Allergies     Past " Surgical History:  Past Surgical History:   Procedure Laterality Date    PROSTATE SURGERY         Social History:  Social History     Socioeconomic History    Marital status: Unknown   Tobacco Use    Smoking status: Never    Smokeless tobacco: Never   Vaping Use    Vaping Use: Never used   Substance and Sexual Activity    Alcohol use: No    Drug use: No    Sexual activity: Never       Family History:  Family History   Problem Relation Age of Onset    No Known Problems Father     No Known Problems Mother        Review of Systems:  Review of Systems   Constitutional:  Positive for fatigue. Negative for chills and fever.   Respiratory:  Negative for shortness of breath and wheezing.    Cardiovascular:  Negative for leg swelling.   Gastrointestinal:  Negative for abdominal pain, nausea and vomiting.   Genitourinary:  Positive for frequency. Negative for difficulty urinating, dysuria, hematuria, testicular pain and urgency.   Musculoskeletal:  Positive for arthralgias, gait problem and myalgias. Negative for back pain and joint swelling.   Neurological:  Negative for dizziness and headaches.   Psychiatric/Behavioral:  Negative for confusion.        Physical Exam:  Physical Exam  Constitutional:       General: He is not in acute distress.     Appearance: Normal appearance.   HENT:      Head: Normocephalic and atraumatic.      Nose: Nose normal.      Mouth/Throat:      Mouth: Mucous membranes are moist.   Eyes:      Conjunctiva/sclera: Conjunctivae normal.   Cardiovascular:      Rate and Rhythm: Normal rate and regular rhythm.      Pulses: Normal pulses.      Heart sounds: Normal heart sounds.   Pulmonary:      Effort: Pulmonary effort is normal.      Breath sounds: Normal breath sounds.   Abdominal:      General: Bowel sounds are normal.      Palpations: Abdomen is soft.   Musculoskeletal:      Cervical back: Normal range of motion.      Comments: Using cane for ambulation.  Unsteady with walking and requires assistance    Skin:     General: Skin is warm.   Neurological:      General: No focal deficit present.      Mental Status: He is alert and oriented to person, place, and time.   Psychiatric:         Mood and Affect: Mood normal.         Behavior: Behavior normal.         Thought Content: Thought content normal.         Judgment: Judgment normal.             Recent Image (CT and/or KUB):   CT Abdomen and Pelvis: No results found for this or any previous visit.     CT Stone Protocol: No results found for this or any previous visit.     KUB: Results for orders placed during the hospital encounter of 05/25/21    XR Abdomen KUB    Narrative  EXAM:  XR Abdomen, 1 View    EXAM DATE:  5/25/2021 3:56 PM    CLINICAL HISTORY:  BILATERAL FLANK PAIN; R31.0-Gross hematuria; R10.9-Unspecified  abdominal pain    TECHNIQUE:  Frontal supine view of the abdomen/pelvis.    COMPARISON:  No relevant prior studies available.    FINDINGS:  GASTROINTESTINAL TRACT:  Abundant stool throughout the colon.  No  dilation.  BONES/JOINTS:  Unremarkable.    Impression  Abundant stool throughout the colon.    This report was finalized on 5/26/2021 8:49 AM by Dr. Ayden Nicole MD.       Labs:  Brief Urine Lab Results  (Last result in the past 365 days)        Color   Clarity   Blood   Leuk Est   Nitrite   Protein   CREAT   Urine HCG        08/31/23 1321 Red   Clear   3+   500 Irma/ul   Positive   3+                 Office Visit on 08/31/2023   Component Date Value Ref Range Status    Color 08/31/2023 Red (A)  Yellow, Straw, Dark Yellow, Marlene Final    Clarity, UA 08/31/2023 Clear  Clear Final    Specific Gravity  08/31/2023 1.025  1.005 - 1.030 Final    pH, Urine 08/31/2023 5.0  5.0 - 8.0 Final    Leukocytes 08/31/2023 500 Irma/ul (A)  Negative Final    Nitrite, UA 08/31/2023 Positive (A)  Negative Final    Protein, POC 08/31/2023 3+ (A)  Negative mg/dL Final    Glucose, UA 08/31/2023 Negative  Negative mg/dL Final    Ketones, UA 08/31/2023 Negative  Negative Final     Urobilinogen, UA 08/31/2023 Normal  Normal, 0.2 E.U./dL Final    Bilirubin 08/31/2023 Negative  Negative Final    Blood, UA 08/31/2023 3+ (A)  Negative Final    Lot Number 08/31/2023 [id number]   Final    Expiration Date 08/31/2023 102,024   Final    Urine Culture 08/31/2023 No growth   Final    Glucose 08/31/2023 157 (H)  65 - 99 mg/dL Final    BUN 08/31/2023 21  8 - 23 mg/dL Final    Creatinine 08/31/2023 1.15  0.76 - 1.27 mg/dL Final    Sodium 08/31/2023 139  136 - 145 mmol/L Final    Potassium 08/31/2023 4.7  3.5 - 5.2 mmol/L Final    Chloride 08/31/2023 103  98 - 107 mmol/L Final    CO2 08/31/2023 23.1  22.0 - 29.0 mmol/L Final    Calcium 08/31/2023 9.8 (H)  8.2 - 9.6 mg/dL Final    Total Protein 08/31/2023 7.8  6.0 - 8.5 g/dL Final    Albumin 08/31/2023 4.1  3.5 - 5.2 g/dL Final    ALT (SGPT) 08/31/2023 20  1 - 41 U/L Final    AST (SGOT) 08/31/2023 21  1 - 40 U/L Final    Alkaline Phosphatase 08/31/2023 80  39 - 117 U/L Final    Total Bilirubin 08/31/2023 0.5  0.0 - 1.2 mg/dL Final    Globulin 08/31/2023 3.7  gm/dL Final    A/G Ratio 08/31/2023 1.1  g/dL Final    BUN/Creatinine Ratio 08/31/2023 18.3  7.0 - 25.0 Final    Anion Gap 08/31/2023 12.9  5.0 - 15.0 mmol/L Final    eGFR 08/31/2023 60.5  >60.0 mL/min/1.73 Final        Procedure: None  Procedures     I have reviewed and agree with the above PMH, PSH, FMH, social history, medications, allergies, and labs.     Assessment/Plan:   Problem List Items Addressed This Visit          Genitourinary and Reproductive     Urinary tract infection with hematuria    Relevant Medications    nitrofurantoin, macrocrystal-monohydrate, (Macrobid) 100 MG capsule    Benign prostatic hyperplasia with nocturia - Primary       Health Maintenance:   Health Maintenance Due   Topic Date Due    URINE MICROALBUMIN  Never done    BMI FOLLOWUP  Never done    Pneumococcal Vaccine 65+ (1 - PCV) Never done    TDAP/TD VACCINES (1 - Tdap) Never done    ZOSTER VACCINE (1 of 2) Never  done    ANNUAL WELLNESS VISIT  Never done    HEMOGLOBIN A1C  Never done    DIABETIC EYE EXAM  Never done    INFLUENZA VACCINE  08/01/2023    COVID-19 Vaccine (3 - 2023-24 season) 09/01/2023        Smoking Counseling: Never smoked or use smokeless tobacco    Urine Incontinence: Patient reports that he is not currently experiencing any symptoms of urinary incontinence.    Patient was given instructions and counseling regarding his condition or for health maintenance advice. Please see specific information pulled into the AVS if appropriate.    Patient Education:   BPH with nocturia -patient has been on Flomax once every 48 hours with improvement in symptoms.  He still gets up roughly 3 times throughout the night.  I did discuss increasing to once nightly however patient feels that symptoms are tolerable at this time and wishes to keep medication as is.  Also discussed the use of 5 alpha reductase inhibitors however patient declined.  Urinary tract infection -discussed with patient the pathophysiology of urinary tract infection.  Advised patient to increase water intake to 2 to 3 L/day.  Advised patient to take a daily probiotic to help with growth control of normal urogenital vincent.  He was unable to urinate in office so I will send to urine specimen With the Patient to Drop off a Urine Sample in Office Once Available.  Given Change in Mental Status I Will Give the Patient a Prescription of Macrobid 100 Mg Every 12 Hours for 5 Days.  Did discuss the use of a nightly antibiotic, recommended to hold off at this time until urine cultures are obtained.  I will call them on Monday if urine culture is given before the weekend.  Otherwise I will see the patient back in 1 year for reevaluation.    Visit Diagnoses:    ICD-10-CM ICD-9-CM   1. Benign prostatic hyperplasia with nocturia  N40.1 600.01    R35.1 788.43   2. Urinary tract infection with hematuria, site unspecified  N39.0 599.0    R31.9 599.70       Meds Ordered  During Visit:  New Medications Ordered This Visit   Medications    nitrofurantoin, macrocrystal-monohydrate, (Macrobid) 100 MG capsule     Sig: Take 1 capsule by mouth Every 12 (Twelve) Hours.     Dispense:  10 capsule     Refill:  0       Follow Up Appointment: 1 year  No follow-ups on file.      This document has been electronically signed by Michel Mendez PA-C   November 30, 2023 16:20 EST    Part of this note may be an electronic transcription/translation of spoken language to printed text using the Dragon Dictation System.

## 2023-12-01 ENCOUNTER — LAB (OUTPATIENT)
Dept: UROLOGY | Facility: CLINIC | Age: 88
End: 2023-12-01
Payer: MEDICARE

## 2023-12-01 DIAGNOSIS — R31.9 URINARY TRACT INFECTION WITH HEMATURIA, SITE UNSPECIFIED: Primary | ICD-10-CM

## 2023-12-01 DIAGNOSIS — N39.0 URINARY TRACT INFECTION WITH HEMATURIA, SITE UNSPECIFIED: Primary | ICD-10-CM

## 2023-12-01 PROCEDURE — 87086 URINE CULTURE/COLONY COUNT: CPT

## 2023-12-03 LAB — BACTERIA SPEC AEROBE CULT: NORMAL

## 2023-12-04 ENCOUNTER — TELEPHONE (OUTPATIENT)
Dept: UROLOGY | Facility: CLINIC | Age: 88
End: 2023-12-04
Payer: MEDICARE

## 2023-12-04 NOTE — TELEPHONE ENCOUNTER
Called Mr. Garcia and spoke to his wife about urine culture.  Advised her there was mixed vincent growth only and he can discontinue the antibiotic.  Educated her to bring him into the clinic sooner if needed otherwise we will see him in a year.

## 2024-02-06 ENCOUNTER — LAB (OUTPATIENT)
Dept: UROLOGY | Facility: CLINIC | Age: 89
End: 2024-02-06
Payer: MEDICARE

## 2024-02-06 DIAGNOSIS — R31.9 URINARY TRACT INFECTION WITH HEMATURIA, SITE UNSPECIFIED: Primary | ICD-10-CM

## 2024-02-06 DIAGNOSIS — N39.0 URINARY TRACT INFECTION WITH HEMATURIA, SITE UNSPECIFIED: Primary | ICD-10-CM

## 2024-02-06 PROCEDURE — 87086 URINE CULTURE/COLONY COUNT: CPT

## 2024-02-07 ENCOUNTER — TELEPHONE (OUTPATIENT)
Dept: UROLOGY | Facility: CLINIC | Age: 89
End: 2024-02-07
Payer: MEDICARE

## 2024-02-07 LAB — BACTERIA SPEC AEROBE CULT: NORMAL

## 2024-02-07 NOTE — TELEPHONE ENCOUNTER
----- Message from Michel Mendez PA-C sent at 2/7/2024 12:38 PM EST -----  Please let family know that urine culture showed no infection.  Thanks.  ----- Message -----  From: Lab, Background User  Sent: 2/7/2024  12:26 PM EST  To: Michel Mendez PA-C

## 2024-03-20 ENCOUNTER — HOSPITAL ENCOUNTER (OUTPATIENT)
Dept: CT IMAGING | Facility: HOSPITAL | Age: 89
Discharge: HOME | End: 2024-03-20
Payer: MEDICARE

## 2024-03-20 ENCOUNTER — OFFICE VISIT (OUTPATIENT)
Dept: UROLOGY | Facility: CLINIC | Age: 89
End: 2024-03-20
Payer: MEDICARE

## 2024-03-20 VITALS
HEART RATE: 97 BPM | SYSTOLIC BLOOD PRESSURE: 97 MMHG | BODY MASS INDEX: 28.14 KG/M2 | WEIGHT: 190 LBS | DIASTOLIC BLOOD PRESSURE: 54 MMHG | HEIGHT: 69 IN

## 2024-03-20 DIAGNOSIS — R31.9 URINARY TRACT INFECTION WITH HEMATURIA, SITE UNSPECIFIED: Primary | ICD-10-CM

## 2024-03-20 DIAGNOSIS — N39.0 URINARY TRACT INFECTION WITH HEMATURIA, SITE UNSPECIFIED: Primary | ICD-10-CM

## 2024-03-20 DIAGNOSIS — R31.0 GROSS HEMATURIA: ICD-10-CM

## 2024-03-20 DIAGNOSIS — M79.89 PARASPINAL SOFT TISSUE MASS: ICD-10-CM

## 2024-03-20 PROCEDURE — 85027 COMPLETE CBC AUTOMATED: CPT

## 2024-03-20 PROCEDURE — 80053 COMPREHEN METABOLIC PANEL: CPT

## 2024-03-20 PROCEDURE — 74176 CT ABD & PELVIS W/O CONTRAST: CPT | Performed by: RADIOLOGY

## 2024-03-20 PROCEDURE — 74176 CT ABD & PELVIS W/O CONTRAST: CPT

## 2024-03-20 PROCEDURE — 87086 URINE CULTURE/COLONY COUNT: CPT

## 2024-03-20 NOTE — PROGRESS NOTES
"Chief Complaint:    Chief Complaint   Patient presents with    Blood in Urine       Vital Signs:   BP 97/54 (BP Location: Left arm, Patient Position: Sitting, Cuff Size: Adult)   Pulse 97   Ht 175.3 cm (69.02\")   Wt 86.2 kg (190 lb)   BMI 28.05 kg/m²   Body mass index is 28.05 kg/m².      HPI:  Hugo Garcia is a 89 y.o. male who presents today for follow up    History of Present Illness  Mr. Garcia presents to clinic for evaluation of gross hematuria.  He has a past medical history significant for BPH with urinary obstruction.  He has been seen several times in office over the past year for concerns of recurrent urinary tract infection.  Patient has not had a positive urine culture in several months now.  Did have a urine completed in November and February of this year that both showed mixed normal urogenital vincent only.  He has had some intermittent gross blood through the past several months.  Does have a medical history of dementia and is pleasantly confused in office today.  It is to note that the patient did undergo a transurethral resection of the prostate by Dr. Valdovinos roughly 10 years ago.  Patient and wife both state that gross hematuria started yesterday evening.  He reports significant frequency and urgency and feels like he has to go to the bathroom every few minutes.  He does not believe he is emptying well.  He has notable very small amount of gross urinary output.  They did collect some urine to bring in for culture today.  Will send this off.  I did recommend a stat CT scan of the abdomen pelvis was completed during office visit.  Patient had mild distention of the bladder with notable prostate enlargement and previous remanence of a prior TURP.  There was blood products noted throughout the bladder however no significant source was identified.  I did insert a 18 Turkmen coudé indwelling Churchill catheter into the patient's bladder and irrigated the patient's bladder receiving several large blood " clots.  Patient's urinary output remains bloody in color.  Will set the patient up for a urgent cystoscopy with clot evacuation and possible fulguration.  I suspect patient's bleeding is most likely secondary to friable prostatic tissue.      Past Medical History:  Past Medical History:   Diagnosis Date    BPH with urinary obstruction        Current Meds:  Current Outpatient Medications   Medication Sig Dispense Refill    aspirin 81 MG EC tablet Take 1 tablet by mouth Every Other Day.      ondansetron (Zofran) 4 MG tablet Take 1 tablet by mouth Every 12 (Twelve) Hours As Needed for Nausea. 20 tablet 0    simvastatin (ZOCOR) 10 MG tablet Take 1 tablet by mouth Every Night.      tamsulosin (FLOMAX) 0.4 MG capsule 24 hr capsule Take 1 capsule by mouth Daily. 90 capsule 3    nitrofurantoin, macrocrystal-monohydrate, (Macrobid) 100 MG capsule Take 1 capsule by mouth Every 12 (Twelve) Hours. 10 capsule 0     No current facility-administered medications for this visit.        Allergies:   No Known Allergies     Past Surgical History:  Past Surgical History:   Procedure Laterality Date    PROSTATE SURGERY         Social History:  Social History     Socioeconomic History    Marital status: Unknown   Tobacco Use    Smoking status: Never    Smokeless tobacco: Never   Vaping Use    Vaping status: Never Used   Substance and Sexual Activity    Alcohol use: No    Drug use: No    Sexual activity: Never       Family History:  Family History   Problem Relation Age of Onset    No Known Problems Father     No Known Problems Mother        Review of Systems:  Review of Systems   Constitutional:  Positive for fatigue. Negative for chills, fever and unexpected weight change.   Respiratory:  Negative for chest tightness, shortness of breath and wheezing.    Cardiovascular:  Negative for chest pain and leg swelling.   Gastrointestinal:  Negative for abdominal pain, constipation, diarrhea, nausea and vomiting.   Genitourinary:  Positive for  difficulty urinating, frequency, hematuria and urgency. Negative for dysuria and testicular pain.   Musculoskeletal:  Positive for arthralgias, gait problem and myalgias. Negative for back pain and joint swelling.   Skin:  Negative for rash.   Neurological:  Negative for dizziness and headaches.   Psychiatric/Behavioral:  Negative for confusion and suicidal ideas.        Physical Exam:  Physical Exam  Constitutional:       General: He is not in acute distress.     Appearance: Normal appearance.   HENT:      Head: Normocephalic and atraumatic.      Nose: Nose normal.      Mouth/Throat:      Mouth: Mucous membranes are moist.   Eyes:      Conjunctiva/sclera: Conjunctivae normal.   Cardiovascular:      Rate and Rhythm: Normal rate and regular rhythm.      Pulses: Normal pulses.      Heart sounds: Normal heart sounds.   Pulmonary:      Effort: Pulmonary effort is normal.      Breath sounds: Normal breath sounds.   Abdominal:      General: Bowel sounds are normal.      Palpations: Abdomen is soft.   Genitourinary:     Penis: Normal.       Testes: Normal.      Comments: Notable gross hematuria  Musculoskeletal:         General: Normal range of motion.      Cervical back: Normal range of motion.   Skin:     General: Skin is warm.   Neurological:      General: No focal deficit present.      Mental Status: He is alert. Mental status is at baseline. He is disoriented.   Psychiatric:         Mood and Affect: Mood normal.         Behavior: Behavior normal.         Thought Content: Thought content normal.         Judgment: Judgment normal.         Recent Image (CT and/or KUB):   CT Abdomen and Pelvis: No results found for this or any previous visit.     CT Stone Protocol: No results found for this or any previous visit.     KUB: Results for orders placed during the hospital encounter of 05/25/21    XR Abdomen KUB    Narrative  EXAM:  XR Abdomen, 1 View    EXAM DATE:  5/25/2021 3:56 PM    CLINICAL HISTORY:  BILATERAL FLANK PAIN;  R31.0-Gross hematuria; R10.9-Unspecified  abdominal pain    TECHNIQUE:  Frontal supine view of the abdomen/pelvis.    COMPARISON:  No relevant prior studies available.    FINDINGS:  GASTROINTESTINAL TRACT:  Abundant stool throughout the colon.  No  dilation.  BONES/JOINTS:  Unremarkable.    Impression  Abundant stool throughout the colon.    This report was finalized on 5/26/2021 8:49 AM by Dr. Ayden Nicole MD.       Labs:  Brief Urine Lab Results  (Last result in the past 365 days)        Color   Clarity   Blood   Leuk Est   Nitrite   Protein   CREAT   Urine HCG        08/31/23 1321 Red   Clear   3+   500 Irma/ul   Positive   3+                 Office Visit on 03/20/2024   Component Date Value Ref Range Status    WBC 03/20/2024 10.94 (H)  3.40 - 10.80 10*3/mm3 Final    RBC 03/20/2024 5.22  4.14 - 5.80 10*6/mm3 Final    Hemoglobin 03/20/2024 15.7  13.0 - 17.7 g/dL Final    Hematocrit 03/20/2024 46.3  37.5 - 51.0 % Final    MCV 03/20/2024 88.7  79.0 - 97.0 fL Final    MCH 03/20/2024 30.1  26.6 - 33.0 pg Final    MCHC 03/20/2024 33.9  31.5 - 35.7 g/dL Final    RDW 03/20/2024 14.5  12.3 - 15.4 % Final    RDW-SD 03/20/2024 46.0  37.0 - 54.0 fl Final    MPV 03/20/2024 10.1  6.0 - 12.0 fL Final    Platelets 03/20/2024 280  140 - 450 10*3/mm3 Final    Glucose 03/20/2024 152 (H)  65 - 99 mg/dL Final    BUN 03/20/2024 11  8 - 23 mg/dL Final    Creatinine 03/20/2024 0.95  0.76 - 1.27 mg/dL Final    Sodium 03/20/2024 136  136 - 145 mmol/L Final    Potassium 03/20/2024 4.0  3.5 - 5.2 mmol/L Final    Chloride 03/20/2024 99  98 - 107 mmol/L Final    CO2 03/20/2024 20.6 (L)  22.0 - 29.0 mmol/L Final    Calcium 03/20/2024 9.4  8.2 - 9.6 mg/dL Final    Total Protein 03/20/2024 7.0  6.0 - 8.5 g/dL Final    Albumin 03/20/2024 3.7  3.5 - 5.2 g/dL Final    ALT (SGPT) 03/20/2024 22  1 - 41 U/L Final    AST (SGOT) 03/20/2024 21  1 - 40 U/L Final    Alkaline Phosphatase 03/20/2024 95  39 - 117 U/L Final    Total Bilirubin 03/20/2024 0.4   0.0 - 1.2 mg/dL Final    Globulin 03/20/2024 3.3  gm/dL Final    A/G Ratio 03/20/2024 1.1  g/dL Final    BUN/Creatinine Ratio 03/20/2024 11.6  7.0 - 25.0 Final    Anion Gap 03/20/2024 16.4 (H)  5.0 - 15.0 mmol/L Final    eGFR 03/20/2024 76.0  >60.0 mL/min/1.73 Final   Lab on 02/06/2024   Component Date Value Ref Range Status    Urine Culture 02/06/2024 50,000 CFU/mL Mixed Pepper Isolated   Final        Procedure:   Insert 18 Beninese indwelling Blad Cath, Comp    Date/Time: 3/21/2024 8:50 AM    Performed by: Michel Mendez PA-C  Authorized by: Michel Mendez PA-C  Consent: Verbal consent obtained.  Risks and benefits: risks, benefits and alternatives were discussed  Consent given by: patient  Patient understanding: patient states understanding of the procedure being performed  Patient consent: the patient's understanding of the procedure matches consent given  Procedure consent: procedure consent matches procedure scheduled  Relevant documents: relevant documents present and verified  Test results: test results available and properly labeled  Site marked: the operative site was marked  Imaging studies: imaging studies available  Patient identity confirmed: verbally with patient  Preparation: Patient was prepped and draped in the usual sterile fashion.  Local anesthesia used: yes    Anesthesia:  Local anesthesia used: yes  Local Anesthetic: topical anesthetic    Sedation:  Patient sedated: no    Patient tolerance: patient tolerated the procedure well with no immediate complications      Irrigation of Bladder    Date/Time: 3/21/2024 8:51 AM    Performed by: Michel Mendez PA-C  Authorized by: Michel Mendez PA-C  Consent: Verbal consent obtained.  Risks and benefits: risks, benefits and alternatives were discussed  Consent given by: patient  Patient understanding: patient states understanding of the procedure being performed  Patient consent: the patient's understanding of the procedure matches consent  given  Procedure consent: procedure consent matches procedure scheduled  Relevant documents: relevant documents present and verified  Test results: test results available and properly labeled  Site marked: the operative site was marked  Imaging studies: imaging studies available  Patient identity confirmed: verbally with patient  Preparation: Patient was prepped and draped in the usual sterile fashion.  Local anesthesia used: no    Anesthesia:  Local anesthesia used: no    Sedation:  Patient sedated: no    Patient tolerance: patient tolerated the procedure well with no immediate complications           I have reviewed and agree with the above PMH, PSH, FMH, social history, medications, allergies, and labs.     Assessment/Plan:   Problem List Items Addressed This Visit          Genitourinary and Reproductive     Urinary tract infection with hematuria - Primary    Relevant Medications    nitrofurantoin, macrocrystal-monohydrate, (Macrobid) 100 MG capsule    Other Relevant Orders    Urine Culture - Urine, Urine, Random Void    Gross hematuria    Relevant Medications    nitrofurantoin, macrocrystal-monohydrate, (Macrobid) 100 MG capsule    Other Relevant Orders    CT Abdomen Pelvis Stone Protocol (Completed)    CBC (No Diff) (Completed)    Comprehensive Metabolic Panel (Completed)    Case Request (Completed)    Insert 18 Polish indwelling Blad Cath, Comp    Irrigation of Bladder       Health Maintenance:   Health Maintenance Due   Topic Date Due    URINE MICROALBUMIN  Never done    BMI FOLLOWUP  Never done    Pneumococcal Vaccine 65+ (1 of 2 - PCV) Never done    DIABETIC EYE EXAM  Never done    TDAP/TD VACCINES (1 - Tdap) Never done    ZOSTER VACCINE (1 of 2) Never done    RSV Vaccine - Adults (1 - 1-dose 60+ series) Never done    ANNUAL WELLNESS VISIT  Never done    HEMOGLOBIN A1C  Never done    INFLUENZA VACCINE  08/01/2023    COVID-19 Vaccine (3 - 2023-24 season) 09/01/2023        Smoking Counseling: Never smoked or  use smokeless tobacco    Urine Incontinence: Patient reports that he is not currently experiencing any symptoms of urinary incontinence.    Patient was given instructions and counseling regarding his condition or for health maintenance advice. Please see specific information pulled into the AVS if appropriate.    Patient Education:   Urinary tract infection -patient has had several urine cultures in the past few months that if showed mixed vincent growth only.  Given patient's acute gross hematuria and lower urinary tract symptoms I will send patient's urine off for culture.  I will call with results once available.  Due to concerns of acute UTI we will start the patient on Macrobid 100 mg once every 12 hours.  Advised patient and family if he begins to experience fever, chills, nausea, vomiting, or increase in bladder pain to call promptly and we will start the patient on antibiotics.  They verbalized understanding.  Gross hematuria -patient had a CT scan of the abdomen pelvis completed today that showed significant blood clots within the bladder.  He did have known prostatic enlargement is roughly 10 years post a transurethral section of the prostate by Dr. Valdovinos.  I suspect the patient's gross hematuria is either secondary to acute urinary tract infection or from significant regrowth of friable prostatic tissue.  Will set the patient up for a urgent cystoscopy with clot evacuation and possible fulguration.  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  Did discuss all this with patient and family and they verbalized understanding.  Obtain a CBC and a CMP in office today and call patient with results once available tomorrow.  Advised family to call with any questions or concerns.  They verbalized understanding.    Visit Diagnoses:    ICD-10-CM ICD-9-CM   1. Urinary tract infection with hematuria, site unspecified  N39.0 599.0    R31.9  599.70   2. Gross hematuria  R31.0 599.71       Meds Ordered During Visit:  New Medications Ordered This Visit   Medications    nitrofurantoin, macrocrystal-monohydrate, (Macrobid) 100 MG capsule     Sig: Take 1 capsule by mouth Every 12 (Twelve) Hours.     Dispense:  10 capsule     Refill:  0       Follow Up Appointment: Cystoscopy with clot evacuation on Monday  No follow-ups on file.      This document has been electronically signed by Michel Mendez PA-C   March 21, 2024 08:52 EDT    Part of this note may be an electronic transcription/translation of spoken language to printed text using the Dragon Dictation System.

## 2024-03-20 NOTE — H&P (VIEW-ONLY)
"Chief Complaint:    Chief Complaint   Patient presents with    Blood in Urine       Vital Signs:   BP 97/54 (BP Location: Left arm, Patient Position: Sitting, Cuff Size: Adult)   Pulse 97   Ht 175.3 cm (69.02\")   Wt 86.2 kg (190 lb)   BMI 28.05 kg/m²   Body mass index is 28.05 kg/m².      HPI:  Hugo Garcia is a 89 y.o. male who presents today for follow up    History of Present Illness  Mr. Garcia presents to clinic for evaluation of gross hematuria.  He has a past medical history significant for BPH with urinary obstruction.  He has been seen several times in office over the past year for concerns of recurrent urinary tract infection.  Patient has not had a positive urine culture in several months now.  Did have a urine completed in November and February of this year that both showed mixed normal urogenital vincent only.  He has had some intermittent gross blood through the past several months.  Does have a medical history of dementia and is pleasantly confused in office today.  It is to note that the patient did undergo a transurethral resection of the prostate by Dr. Valdovinos roughly 10 years ago.  Patient and wife both state that gross hematuria started yesterday evening.  He reports significant frequency and urgency and feels like he has to go to the bathroom every few minutes.  He does not believe he is emptying well.  He has notable very small amount of gross urinary output.  They did collect some urine to bring in for culture today.  Will send this off.  I did recommend a stat CT scan of the abdomen pelvis was completed during office visit.  Patient had mild distention of the bladder with notable prostate enlargement and previous remanence of a prior TURP.  There was blood products noted throughout the bladder however no significant source was identified.  I did insert a 18 Cuban coudé indwelling Churchill catheter into the patient's bladder and irrigated the patient's bladder receiving several large blood " clots.  Patient's urinary output remains bloody in color.  Will set the patient up for a urgent cystoscopy with clot evacuation and possible fulguration.  I suspect patient's bleeding is most likely secondary to friable prostatic tissue.      Past Medical History:  Past Medical History:   Diagnosis Date    BPH with urinary obstruction        Current Meds:  Current Outpatient Medications   Medication Sig Dispense Refill    aspirin 81 MG EC tablet Take 1 tablet by mouth Every Other Day.      ondansetron (Zofran) 4 MG tablet Take 1 tablet by mouth Every 12 (Twelve) Hours As Needed for Nausea. 20 tablet 0    simvastatin (ZOCOR) 10 MG tablet Take 1 tablet by mouth Every Night.      tamsulosin (FLOMAX) 0.4 MG capsule 24 hr capsule Take 1 capsule by mouth Daily. 90 capsule 3    nitrofurantoin, macrocrystal-monohydrate, (Macrobid) 100 MG capsule Take 1 capsule by mouth Every 12 (Twelve) Hours. 10 capsule 0     No current facility-administered medications for this visit.        Allergies:   No Known Allergies     Past Surgical History:  Past Surgical History:   Procedure Laterality Date    PROSTATE SURGERY         Social History:  Social History     Socioeconomic History    Marital status: Unknown   Tobacco Use    Smoking status: Never    Smokeless tobacco: Never   Vaping Use    Vaping status: Never Used   Substance and Sexual Activity    Alcohol use: No    Drug use: No    Sexual activity: Never       Family History:  Family History   Problem Relation Age of Onset    No Known Problems Father     No Known Problems Mother        Review of Systems:  Review of Systems   Constitutional:  Positive for fatigue. Negative for chills, fever and unexpected weight change.   Respiratory:  Negative for chest tightness, shortness of breath and wheezing.    Cardiovascular:  Negative for chest pain and leg swelling.   Gastrointestinal:  Negative for abdominal pain, constipation, diarrhea, nausea and vomiting.   Genitourinary:  Positive for  difficulty urinating, frequency, hematuria and urgency. Negative for dysuria and testicular pain.   Musculoskeletal:  Positive for arthralgias, gait problem and myalgias. Negative for back pain and joint swelling.   Skin:  Negative for rash.   Neurological:  Negative for dizziness and headaches.   Psychiatric/Behavioral:  Negative for confusion and suicidal ideas.        Physical Exam:  Physical Exam  Constitutional:       General: He is not in acute distress.     Appearance: Normal appearance.   HENT:      Head: Normocephalic and atraumatic.      Nose: Nose normal.      Mouth/Throat:      Mouth: Mucous membranes are moist.   Eyes:      Conjunctiva/sclera: Conjunctivae normal.   Cardiovascular:      Rate and Rhythm: Normal rate and regular rhythm.      Pulses: Normal pulses.      Heart sounds: Normal heart sounds.   Pulmonary:      Effort: Pulmonary effort is normal.      Breath sounds: Normal breath sounds.   Abdominal:      General: Bowel sounds are normal.      Palpations: Abdomen is soft.   Genitourinary:     Penis: Normal.       Testes: Normal.      Comments: Notable gross hematuria  Musculoskeletal:         General: Normal range of motion.      Cervical back: Normal range of motion.   Skin:     General: Skin is warm.   Neurological:      General: No focal deficit present.      Mental Status: He is alert. Mental status is at baseline. He is disoriented.   Psychiatric:         Mood and Affect: Mood normal.         Behavior: Behavior normal.         Thought Content: Thought content normal.         Judgment: Judgment normal.         Recent Image (CT and/or KUB):   CT Abdomen and Pelvis: No results found for this or any previous visit.     CT Stone Protocol: No results found for this or any previous visit.     KUB: Results for orders placed during the hospital encounter of 05/25/21    XR Abdomen KUB    Narrative  EXAM:  XR Abdomen, 1 View    EXAM DATE:  5/25/2021 3:56 PM    CLINICAL HISTORY:  BILATERAL FLANK PAIN;  R31.0-Gross hematuria; R10.9-Unspecified  abdominal pain    TECHNIQUE:  Frontal supine view of the abdomen/pelvis.    COMPARISON:  No relevant prior studies available.    FINDINGS:  GASTROINTESTINAL TRACT:  Abundant stool throughout the colon.  No  dilation.  BONES/JOINTS:  Unremarkable.    Impression  Abundant stool throughout the colon.    This report was finalized on 5/26/2021 8:49 AM by Dr. Ayden Nicole MD.       Labs:  Brief Urine Lab Results  (Last result in the past 365 days)        Color   Clarity   Blood   Leuk Est   Nitrite   Protein   CREAT   Urine HCG        08/31/23 1321 Red   Clear   3+   500 Irma/ul   Positive   3+                 Office Visit on 03/20/2024   Component Date Value Ref Range Status    WBC 03/20/2024 10.94 (H)  3.40 - 10.80 10*3/mm3 Final    RBC 03/20/2024 5.22  4.14 - 5.80 10*6/mm3 Final    Hemoglobin 03/20/2024 15.7  13.0 - 17.7 g/dL Final    Hematocrit 03/20/2024 46.3  37.5 - 51.0 % Final    MCV 03/20/2024 88.7  79.0 - 97.0 fL Final    MCH 03/20/2024 30.1  26.6 - 33.0 pg Final    MCHC 03/20/2024 33.9  31.5 - 35.7 g/dL Final    RDW 03/20/2024 14.5  12.3 - 15.4 % Final    RDW-SD 03/20/2024 46.0  37.0 - 54.0 fl Final    MPV 03/20/2024 10.1  6.0 - 12.0 fL Final    Platelets 03/20/2024 280  140 - 450 10*3/mm3 Final    Glucose 03/20/2024 152 (H)  65 - 99 mg/dL Final    BUN 03/20/2024 11  8 - 23 mg/dL Final    Creatinine 03/20/2024 0.95  0.76 - 1.27 mg/dL Final    Sodium 03/20/2024 136  136 - 145 mmol/L Final    Potassium 03/20/2024 4.0  3.5 - 5.2 mmol/L Final    Chloride 03/20/2024 99  98 - 107 mmol/L Final    CO2 03/20/2024 20.6 (L)  22.0 - 29.0 mmol/L Final    Calcium 03/20/2024 9.4  8.2 - 9.6 mg/dL Final    Total Protein 03/20/2024 7.0  6.0 - 8.5 g/dL Final    Albumin 03/20/2024 3.7  3.5 - 5.2 g/dL Final    ALT (SGPT) 03/20/2024 22  1 - 41 U/L Final    AST (SGOT) 03/20/2024 21  1 - 40 U/L Final    Alkaline Phosphatase 03/20/2024 95  39 - 117 U/L Final    Total Bilirubin 03/20/2024 0.4   0.0 - 1.2 mg/dL Final    Globulin 03/20/2024 3.3  gm/dL Final    A/G Ratio 03/20/2024 1.1  g/dL Final    BUN/Creatinine Ratio 03/20/2024 11.6  7.0 - 25.0 Final    Anion Gap 03/20/2024 16.4 (H)  5.0 - 15.0 mmol/L Final    eGFR 03/20/2024 76.0  >60.0 mL/min/1.73 Final   Lab on 02/06/2024   Component Date Value Ref Range Status    Urine Culture 02/06/2024 50,000 CFU/mL Mixed Pepper Isolated   Final        Procedure:   Insert 18 Micronesian indwelling Blad Cath, Comp    Date/Time: 3/21/2024 8:50 AM    Performed by: Michel Mendez PA-C  Authorized by: Michel Mendez PA-C  Consent: Verbal consent obtained.  Risks and benefits: risks, benefits and alternatives were discussed  Consent given by: patient  Patient understanding: patient states understanding of the procedure being performed  Patient consent: the patient's understanding of the procedure matches consent given  Procedure consent: procedure consent matches procedure scheduled  Relevant documents: relevant documents present and verified  Test results: test results available and properly labeled  Site marked: the operative site was marked  Imaging studies: imaging studies available  Patient identity confirmed: verbally with patient  Preparation: Patient was prepped and draped in the usual sterile fashion.  Local anesthesia used: yes    Anesthesia:  Local anesthesia used: yes  Local Anesthetic: topical anesthetic    Sedation:  Patient sedated: no    Patient tolerance: patient tolerated the procedure well with no immediate complications      Irrigation of Bladder    Date/Time: 3/21/2024 8:51 AM    Performed by: Michel Mendez PA-C  Authorized by: Michel Mendez PA-C  Consent: Verbal consent obtained.  Risks and benefits: risks, benefits and alternatives were discussed  Consent given by: patient  Patient understanding: patient states understanding of the procedure being performed  Patient consent: the patient's understanding of the procedure matches consent  given  Procedure consent: procedure consent matches procedure scheduled  Relevant documents: relevant documents present and verified  Test results: test results available and properly labeled  Site marked: the operative site was marked  Imaging studies: imaging studies available  Patient identity confirmed: verbally with patient  Preparation: Patient was prepped and draped in the usual sterile fashion.  Local anesthesia used: no    Anesthesia:  Local anesthesia used: no    Sedation:  Patient sedated: no    Patient tolerance: patient tolerated the procedure well with no immediate complications           I have reviewed and agree with the above PMH, PSH, FMH, social history, medications, allergies, and labs.     Assessment/Plan:   Problem List Items Addressed This Visit          Genitourinary and Reproductive     Urinary tract infection with hematuria - Primary    Relevant Medications    nitrofurantoin, macrocrystal-monohydrate, (Macrobid) 100 MG capsule    Other Relevant Orders    Urine Culture - Urine, Urine, Random Void    Gross hematuria    Relevant Medications    nitrofurantoin, macrocrystal-monohydrate, (Macrobid) 100 MG capsule    Other Relevant Orders    CT Abdomen Pelvis Stone Protocol (Completed)    CBC (No Diff) (Completed)    Comprehensive Metabolic Panel (Completed)    Case Request (Completed)    Insert 18 Portuguese indwelling Blad Cath, Comp    Irrigation of Bladder       Health Maintenance:   Health Maintenance Due   Topic Date Due    URINE MICROALBUMIN  Never done    BMI FOLLOWUP  Never done    Pneumococcal Vaccine 65+ (1 of 2 - PCV) Never done    DIABETIC EYE EXAM  Never done    TDAP/TD VACCINES (1 - Tdap) Never done    ZOSTER VACCINE (1 of 2) Never done    RSV Vaccine - Adults (1 - 1-dose 60+ series) Never done    ANNUAL WELLNESS VISIT  Never done    HEMOGLOBIN A1C  Never done    INFLUENZA VACCINE  08/01/2023    COVID-19 Vaccine (3 - 2023-24 season) 09/01/2023        Smoking Counseling: Never smoked or  use smokeless tobacco    Urine Incontinence: Patient reports that he is not currently experiencing any symptoms of urinary incontinence.    Patient was given instructions and counseling regarding his condition or for health maintenance advice. Please see specific information pulled into the AVS if appropriate.    Patient Education:   Urinary tract infection -patient has had several urine cultures in the past few months that if showed mixed vincent growth only.  Given patient's acute gross hematuria and lower urinary tract symptoms I will send patient's urine off for culture.  I will call with results once available.  Due to concerns of acute UTI we will start the patient on Macrobid 100 mg once every 12 hours.  Advised patient and family if he begins to experience fever, chills, nausea, vomiting, or increase in bladder pain to call promptly and we will start the patient on antibiotics.  They verbalized understanding.  Gross hematuria -patient had a CT scan of the abdomen pelvis completed today that showed significant blood clots within the bladder.  He did have known prostatic enlargement is roughly 10 years post a transurethral section of the prostate by Dr. Valdovinos.  I suspect the patient's gross hematuria is either secondary to acute urinary tract infection or from significant regrowth of friable prostatic tissue.  Will set the patient up for a urgent cystoscopy with clot evacuation and possible fulguration.  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  Did discuss all this with patient and family and they verbalized understanding.  Obtain a CBC and a CMP in office today and call patient with results once available tomorrow.  Advised family to call with any questions or concerns.  They verbalized understanding.    Visit Diagnoses:    ICD-10-CM ICD-9-CM   1. Urinary tract infection with hematuria, site unspecified  N39.0 599.0    R31.9  599.70   2. Gross hematuria  R31.0 599.71       Meds Ordered During Visit:  New Medications Ordered This Visit   Medications    nitrofurantoin, macrocrystal-monohydrate, (Macrobid) 100 MG capsule     Sig: Take 1 capsule by mouth Every 12 (Twelve) Hours.     Dispense:  10 capsule     Refill:  0       Follow Up Appointment: Cystoscopy with clot evacuation on Monday  No follow-ups on file.      This document has been electronically signed by Michel Mendez PA-C   March 21, 2024 08:52 EDT    Part of this note may be an electronic transcription/translation of spoken language to printed text using the Dragon Dictation System.

## 2024-03-21 PROBLEM — R31.0 GROSS HEMATURIA: Status: ACTIVE | Noted: 2024-03-21

## 2024-03-21 LAB
ALBUMIN SERPL-MCNC: 3.7 G/DL (ref 3.5–5.2)
ALBUMIN/GLOB SERPL: 1.1 G/DL
ALP SERPL-CCNC: 95 U/L (ref 39–117)
ALT SERPL W P-5'-P-CCNC: 22 U/L (ref 1–41)
ANION GAP SERPL CALCULATED.3IONS-SCNC: 16.4 MMOL/L (ref 5–15)
AST SERPL-CCNC: 21 U/L (ref 1–40)
BILIRUB SERPL-MCNC: 0.4 MG/DL (ref 0–1.2)
BUN SERPL-MCNC: 11 MG/DL (ref 8–23)
BUN/CREAT SERPL: 11.6 (ref 7–25)
CALCIUM SPEC-SCNC: 9.4 MG/DL (ref 8.2–9.6)
CHLORIDE SERPL-SCNC: 99 MMOL/L (ref 98–107)
CO2 SERPL-SCNC: 20.6 MMOL/L (ref 22–29)
CREAT SERPL-MCNC: 0.95 MG/DL (ref 0.76–1.27)
DEPRECATED RDW RBC AUTO: 46 FL (ref 37–54)
EGFRCR SERPLBLD CKD-EPI 2021: 76 ML/MIN/1.73
ERYTHROCYTE [DISTWIDTH] IN BLOOD BY AUTOMATED COUNT: 14.5 % (ref 12.3–15.4)
GLOBULIN UR ELPH-MCNC: 3.3 GM/DL
GLUCOSE SERPL-MCNC: 152 MG/DL (ref 65–99)
HCT VFR BLD AUTO: 46.3 % (ref 37.5–51)
HGB BLD-MCNC: 15.7 G/DL (ref 13–17.7)
MCH RBC QN AUTO: 30.1 PG (ref 26.6–33)
MCHC RBC AUTO-ENTMCNC: 33.9 G/DL (ref 31.5–35.7)
MCV RBC AUTO: 88.7 FL (ref 79–97)
PLATELET # BLD AUTO: 280 10*3/MM3 (ref 140–450)
PMV BLD AUTO: 10.1 FL (ref 6–12)
POTASSIUM SERPL-SCNC: 4 MMOL/L (ref 3.5–5.2)
PROT SERPL-MCNC: 7 G/DL (ref 6–8.5)
RBC # BLD AUTO: 5.22 10*6/MM3 (ref 4.14–5.8)
SODIUM SERPL-SCNC: 136 MMOL/L (ref 136–145)
WBC NRBC COR # BLD AUTO: 10.94 10*3/MM3 (ref 3.4–10.8)

## 2024-03-21 RX ORDER — NITROFURANTOIN 25; 75 MG/1; MG/1
100 CAPSULE ORAL EVERY 12 HOURS
Qty: 10 CAPSULE | Refills: 0 | Status: SHIPPED | OUTPATIENT
Start: 2024-03-21

## 2024-03-21 RX ORDER — GENTAMICIN SULFATE 80 MG/100ML
80 INJECTION, SOLUTION INTRAVENOUS ONCE
Status: CANCELLED | OUTPATIENT
Start: 2024-03-25 | End: 2024-03-21

## 2024-03-22 ENCOUNTER — TELEPHONE (OUTPATIENT)
Dept: UROLOGY | Facility: CLINIC | Age: 89
End: 2024-03-22
Payer: MEDICARE

## 2024-03-22 LAB — BACTERIA SPEC AEROBE CULT: NO GROWTH

## 2024-03-25 ENCOUNTER — APPOINTMENT (OUTPATIENT)
Dept: GENERAL RADIOLOGY | Facility: HOSPITAL | Age: 89
End: 2024-03-25
Payer: MEDICARE

## 2024-03-25 ENCOUNTER — ANESTHESIA EVENT (OUTPATIENT)
Dept: PERIOP | Facility: HOSPITAL | Age: 89
End: 2024-03-25
Payer: MEDICARE

## 2024-03-25 ENCOUNTER — ANESTHESIA (OUTPATIENT)
Dept: PERIOP | Facility: HOSPITAL | Age: 89
End: 2024-03-25
Payer: MEDICARE

## 2024-03-25 ENCOUNTER — HOSPITAL ENCOUNTER (OUTPATIENT)
Facility: HOSPITAL | Age: 89
Setting detail: HOSPITAL OUTPATIENT SURGERY
Discharge: HOME | End: 2024-03-25
Attending: UROLOGY | Admitting: UROLOGY
Payer: MEDICARE

## 2024-03-25 VITALS
BODY MASS INDEX: 27.64 KG/M2 | WEIGHT: 186.6 LBS | DIASTOLIC BLOOD PRESSURE: 82 MMHG | HEART RATE: 84 BPM | SYSTOLIC BLOOD PRESSURE: 138 MMHG | RESPIRATION RATE: 16 BRPM | TEMPERATURE: 97.9 F | OXYGEN SATURATION: 94 % | HEIGHT: 69 IN

## 2024-03-25 DIAGNOSIS — R31.0 GROSS HEMATURIA: ICD-10-CM

## 2024-03-25 PROCEDURE — 25010000002 GENTAMICIN PER 80 MG

## 2024-03-25 PROCEDURE — 25810000003 LACTATED RINGERS PER 1000 ML: Performed by: ANESTHESIOLOGY

## 2024-03-25 PROCEDURE — 25010000002 FENTANYL CITRATE (PF) 50 MCG/ML SOLUTION: Performed by: NURSE ANESTHETIST, CERTIFIED REGISTERED

## 2024-03-25 PROCEDURE — 25810000003 SODIUM CHLORIDE PER 500 ML: Performed by: UROLOGY

## 2024-03-25 PROCEDURE — 25010000002 ONDANSETRON PER 1 MG: Performed by: NURSE ANESTHETIST, CERTIFIED REGISTERED

## 2024-03-25 PROCEDURE — 52001 CYSTO W/IRRG&EVAC MLT CLOTS: CPT | Performed by: UROLOGY

## 2024-03-25 RX ORDER — MIDAZOLAM HYDROCHLORIDE 1 MG/ML
0.5 INJECTION INTRAMUSCULAR; INTRAVENOUS
Status: DISCONTINUED | OUTPATIENT
Start: 2024-03-25 | End: 2024-03-25 | Stop reason: HOSPADM

## 2024-03-25 RX ORDER — LIDOCAINE HYDROCHLORIDE 20 MG/ML
JELLY TOPICAL AS NEEDED
Status: DISCONTINUED | OUTPATIENT
Start: 2024-03-25 | End: 2024-03-25 | Stop reason: HOSPADM

## 2024-03-25 RX ORDER — FENTANYL CITRATE 50 UG/ML
50 INJECTION, SOLUTION INTRAMUSCULAR; INTRAVENOUS
Status: DISCONTINUED | OUTPATIENT
Start: 2024-03-25 | End: 2024-03-25 | Stop reason: HOSPADM

## 2024-03-25 RX ORDER — FAMOTIDINE 10 MG/ML
INJECTION, SOLUTION INTRAVENOUS AS NEEDED
Status: DISCONTINUED | OUTPATIENT
Start: 2024-03-25 | End: 2024-03-25 | Stop reason: SURG

## 2024-03-25 RX ORDER — SODIUM CHLORIDE 0.9 % (FLUSH) 0.9 %
10 SYRINGE (ML) INJECTION AS NEEDED
Status: DISCONTINUED | OUTPATIENT
Start: 2024-03-25 | End: 2024-03-25 | Stop reason: HOSPADM

## 2024-03-25 RX ORDER — SODIUM CHLORIDE, SODIUM LACTATE, POTASSIUM CHLORIDE, CALCIUM CHLORIDE 600; 310; 30; 20 MG/100ML; MG/100ML; MG/100ML; MG/100ML
125 INJECTION, SOLUTION INTRAVENOUS ONCE
Status: COMPLETED | OUTPATIENT
Start: 2024-03-25 | End: 2024-03-25

## 2024-03-25 RX ORDER — MEPERIDINE HYDROCHLORIDE 25 MG/ML
12.5 INJECTION INTRAMUSCULAR; INTRAVENOUS; SUBCUTANEOUS
Status: DISCONTINUED | OUTPATIENT
Start: 2024-03-25 | End: 2024-03-25 | Stop reason: HOSPADM

## 2024-03-25 RX ORDER — SODIUM CHLORIDE, SODIUM LACTATE, POTASSIUM CHLORIDE, CALCIUM CHLORIDE 600; 310; 30; 20 MG/100ML; MG/100ML; MG/100ML; MG/100ML
100 INJECTION, SOLUTION INTRAVENOUS ONCE AS NEEDED
Status: DISCONTINUED | OUTPATIENT
Start: 2024-03-25 | End: 2024-03-25 | Stop reason: HOSPADM

## 2024-03-25 RX ORDER — IPRATROPIUM BROMIDE AND ALBUTEROL SULFATE 2.5; .5 MG/3ML; MG/3ML
3 SOLUTION RESPIRATORY (INHALATION) ONCE AS NEEDED
Status: DISCONTINUED | OUTPATIENT
Start: 2024-03-25 | End: 2024-03-25 | Stop reason: HOSPADM

## 2024-03-25 RX ORDER — SODIUM CHLORIDE 0.9 % (FLUSH) 0.9 %
10 SYRINGE (ML) INJECTION EVERY 12 HOURS SCHEDULED
Status: DISCONTINUED | OUTPATIENT
Start: 2024-03-25 | End: 2024-03-25 | Stop reason: HOSPADM

## 2024-03-25 RX ORDER — SODIUM CHLORIDE 9 MG/ML
INJECTION, SOLUTION INTRAVENOUS AS NEEDED
Status: DISCONTINUED | OUTPATIENT
Start: 2024-03-25 | End: 2024-03-25 | Stop reason: HOSPADM

## 2024-03-25 RX ORDER — GENTAMICIN SULFATE 80 MG/100ML
80 INJECTION, SOLUTION INTRAVENOUS ONCE
Status: COMPLETED | OUTPATIENT
Start: 2024-03-25 | End: 2024-03-25

## 2024-03-25 RX ORDER — OXYCODONE HYDROCHLORIDE AND ACETAMINOPHEN 5; 325 MG/1; MG/1
1 TABLET ORAL ONCE AS NEEDED
Status: DISCONTINUED | OUTPATIENT
Start: 2024-03-25 | End: 2024-03-25 | Stop reason: HOSPADM

## 2024-03-25 RX ORDER — FENTANYL CITRATE 50 UG/ML
INJECTION, SOLUTION INTRAMUSCULAR; INTRAVENOUS AS NEEDED
Status: DISCONTINUED | OUTPATIENT
Start: 2024-03-25 | End: 2024-03-25 | Stop reason: SURG

## 2024-03-25 RX ORDER — SODIUM CHLORIDE 9 MG/ML
40 INJECTION, SOLUTION INTRAVENOUS AS NEEDED
Status: DISCONTINUED | OUTPATIENT
Start: 2024-03-25 | End: 2024-03-25 | Stop reason: HOSPADM

## 2024-03-25 RX ORDER — ONDANSETRON 2 MG/ML
INJECTION INTRAMUSCULAR; INTRAVENOUS AS NEEDED
Status: DISCONTINUED | OUTPATIENT
Start: 2024-03-25 | End: 2024-03-25 | Stop reason: SURG

## 2024-03-25 RX ORDER — LIDOCAINE HYDROCHLORIDE 20 MG/ML
INJECTION, SOLUTION EPIDURAL; INFILTRATION; INTRACAUDAL; PERINEURAL AS NEEDED
Status: DISCONTINUED | OUTPATIENT
Start: 2024-03-25 | End: 2024-03-25 | Stop reason: SURG

## 2024-03-25 RX ORDER — ONDANSETRON 2 MG/ML
4 INJECTION INTRAMUSCULAR; INTRAVENOUS AS NEEDED
Status: DISCONTINUED | OUTPATIENT
Start: 2024-03-25 | End: 2024-03-25 | Stop reason: HOSPADM

## 2024-03-25 RX ORDER — MAGNESIUM HYDROXIDE 1200 MG/15ML
LIQUID ORAL AS NEEDED
Status: DISCONTINUED | OUTPATIENT
Start: 2024-03-25 | End: 2024-03-25 | Stop reason: HOSPADM

## 2024-03-25 RX ADMIN — FENTANYL CITRATE 25 MCG: 50 INJECTION INTRAMUSCULAR; INTRAVENOUS at 08:26

## 2024-03-25 RX ADMIN — FENTANYL CITRATE 25 MCG: 50 INJECTION INTRAMUSCULAR; INTRAVENOUS at 08:30

## 2024-03-25 RX ADMIN — FAMOTIDINE 20 MG: 10 INJECTION, SOLUTION INTRAVENOUS at 08:15

## 2024-03-25 RX ADMIN — LIDOCAINE HYDROCHLORIDE 60 MG: 20 INJECTION, SOLUTION EPIDURAL; INFILTRATION; INTRACAUDAL; PERINEURAL at 08:19

## 2024-03-25 RX ADMIN — SODIUM CHLORIDE, POTASSIUM CHLORIDE, SODIUM LACTATE AND CALCIUM CHLORIDE: 600; 310; 30; 20 INJECTION, SOLUTION INTRAVENOUS at 08:15

## 2024-03-25 RX ADMIN — ONDANSETRON 4 MG: 2 INJECTION INTRAMUSCULAR; INTRAVENOUS at 08:26

## 2024-03-25 RX ADMIN — FENTANYL CITRATE 25 MCG: 50 INJECTION INTRAMUSCULAR; INTRAVENOUS at 08:15

## 2024-03-25 RX ADMIN — GENTAMICIN SULFATE 80 MG: 80 INJECTION, SOLUTION INTRAVENOUS at 08:15

## 2024-03-25 RX ADMIN — FENTANYL CITRATE 25 MCG: 50 INJECTION INTRAMUSCULAR; INTRAVENOUS at 08:34

## 2024-03-25 NOTE — ANESTHESIA PROCEDURE NOTES
Airway  Urgency: elective    Date/Time: 3/25/2024 8:19 AM  Airway not difficult    General Information and Staff    Patient location during procedure: OR  Anesthesiologist: Ranjan Kim MD  CRNA/CAA: Taylor Ames CRNA    Indications and Patient Condition  Indications for airway management: airway protection    Preoxygenated: yes  Mask difficulty assessment: 0 - not attempted    Final Airway Details  Final airway type: supraglottic airway      Successful airway: classic  Size 4     Number of attempts at approach: 1  Assessment: lips, teeth, and gum same as pre-op    Additional Comments  LMA placed with no trauma noted. Patient tolerated well. Good seal. Secured.

## 2024-03-25 NOTE — ANESTHESIA POSTPROCEDURE EVALUATION
Patient: Hugo Garcia    Procedure Summary       Date: 03/25/24 Room / Location: [facility] OR  / [facility] OR    Anesthesia Start: 0815 Anesthesia Stop: 0836    Procedure: CYSTOSCOPY WITH CLOT EVACUATION AND POSSIBLE FULGRATION Diagnosis:       Gross hematuria      (Gross hematuria [R31.0])    Surgeons: Mega Lester MD Provider: Ranjan Kim MD    Anesthesia Type: general ASA Status: 3            Anesthesia Type: general    Vitals  Vitals Value Taken Time   /89 03/25/24 0906   Temp 98 °F (36.7 °C) 03/25/24 0836   Pulse 86 03/25/24 0906   Resp 18 03/25/24 0906   SpO2 93 % 03/25/24 0906           Post Anesthesia Care and Evaluation    Patient location during evaluation: bedside  Patient participation: complete - patient participated  Level of consciousness: awake and alert  Pain score: 1  Pain management: adequate    Airway patency: patent  Anesthetic complications: No anesthetic complications  PONV Status: none  Cardiovascular status: acceptable  Respiratory status: acceptable  Hydration status: acceptable

## 2024-03-25 NOTE — OP NOTE
CYSTOSCOPY WITH CLOT EVACUATION  Procedure Note    Hugo Garcia  3/25/2024    Pre-op Diagnosis:   Gross hematuria [R31.0]    Post-op Diagnosis:     Post-Op Diagnosis Codes:     * Gross hematuria [R31.0]    Procedure/CPT® Codes:    89-year-old white male with post hematuria status post a TURP by Dr. Arsalan Valdovinos.  Following an extensive informed consent brought the procedure suite he had an existing Churchill and he had been given gentamicin intravenously.  He had a normal urethra.  He had a significant regrowth and residual adenoma that was clearly the source of the bleeding there was trabeculation I removed all clots hemostasis was otherwise excellent I chose not leave the catheter none as I did not see a great deal of obstruction other than although the adenoma.  He tolerated it well we will see him back in the office in about a week  Procedure(s):  CYSTOSCOPY WITH CLOT EVACUATION     Surgeon(s):  Mega Lester MD    Anesthesia: see anesthesia record    Staff:   Circulator: Sally Guerrero RN  Scrub Person: Mary Aguilar  Assistant: Selin Castorena LPN    Estimated Blood Loss: none  Urine Voided: * No values recorded between 3/25/2024  8:14 AM and 3/25/2024  8:35 AM *    Specimens:                None      Drains: None    Findings: Residual and recurrent apical adenoma    Blood: N/A    Complications: None    Grafts and Implants: None    Mega Lester MD     Date: 3/25/2024  Time: 08:36 EDT

## 2024-03-25 NOTE — ANESTHESIA PREPROCEDURE EVALUATION
Anesthesia Evaluation     Patient summary reviewed and Nursing notes reviewed   no history of anesthetic complications:   NPO Solid Status: > 8 hours  NPO Liquid Status: > 8 hours           Airway   TM distance: >3 FB  Neck ROM: full  Dental      Pulmonary - negative pulmonary ROS    breath sounds clear to auscultation  Cardiovascular   Exercise tolerance: good (4-7 METS)    Rhythm: regular  Rate: normal    (+) hyperlipidemia      Neuro/Psych- negative ROS  GI/Hepatic/Renal/Endo - negative ROS     Musculoskeletal (-) negative ROS    Abdominal     Abdomen: soft.   Substance History - negative use     OB/GYN negative ob/gyn ROS         Other      history of cancer    ROS/Med Hx Other:   Sinus tachycardia with occasional premature ventricular complexes and fusion complexes  Left anterior fascicular block  Anterolateral infarct , age undetermined  Abnormal ECG  No previous ECGs available  Confirmed by Girish Crain ([id number]) on 5/13/2022 7:55:00 PM                  Anesthesia Plan    ASA 3     general     intravenous induction     Anesthetic plan, risks, benefits, and alternatives have been provided, discussed and informed consent has been obtained with: patient.  Pre-procedure education provided  Use of blood products discussed with  Consented to blood products.    Plan discussed with CRNA.    CODE STATUS:

## 2024-04-01 ENCOUNTER — OFFICE VISIT (OUTPATIENT)
Dept: UROLOGY | Facility: CLINIC | Age: 89
End: 2024-04-01
Payer: MEDICARE

## 2024-04-01 VITALS
BODY MASS INDEX: 28.1 KG/M2 | WEIGHT: 189.7 LBS | HEIGHT: 69 IN | HEART RATE: 99 BPM | SYSTOLIC BLOOD PRESSURE: 146 MMHG | DIASTOLIC BLOOD PRESSURE: 72 MMHG

## 2024-04-01 DIAGNOSIS — N40.1 BENIGN PROSTATIC HYPERPLASIA WITH NOCTURIA: ICD-10-CM

## 2024-04-01 DIAGNOSIS — R35.1 BENIGN PROSTATIC HYPERPLASIA WITH NOCTURIA: ICD-10-CM

## 2024-04-01 DIAGNOSIS — R33.9 INCOMPLETE EMPTYING OF BLADDER: Primary | ICD-10-CM

## 2024-04-01 DIAGNOSIS — R31.0 GROSS HEMATURIA: ICD-10-CM

## 2024-04-01 NOTE — PROGRESS NOTES
Chief Complaint:      Chief Complaint   Patient presents with    Blood in Urine     Surgery fu        HPI:   89-year-old white male with post hematuria status post a TURP by Dr. Arsalan Valdovinos. Following an extensive informed consent brought the procedure suite he had an existing Churchill and he had been given gentamicin intravenously. He had a normal urethra. He had a significant regrowth and residual adenoma that was clearly the source of the bleeding there was trabeculation I removed all clots hemostasis was otherwise excellent I chose not leave the catheter none as I did not see a great deal of obstruction other than although the adenoma.  He returns today accompanied by his daughter I discussed the regrowth of prostate I restarted the alpha blockade there was a question of overflow incontinence his postvoid was 89 cc he does have frequent need for depends unfortunately is not covered he previously lived in [facility].  I gave him reassurance I will see him back in 6 months the only other alternative would be a Churchill catheter which I am afraid would not be suitable for him    Past Medical History:     Past Medical History:   Diagnosis Date    Arthritis     BPH with urinary obstruction     Cancer     skin-nose    Dementia     Elevated cholesterol     Falls frequently     Churchill catheter in place on admission     GERD (gastroesophageal reflux disease)     Gross hematuria     [place] (hard of hearing)     Intermittent confusion        Current Meds:     Current Outpatient Medications   Medication Sig Dispense Refill    aspirin 81 MG EC tablet Take 1 tablet by mouth Every Other Day.      nitrofurantoin, macrocrystal-monohydrate, (Macrobid) 100 MG capsule Take 1 capsule by mouth Every 12 (Twelve) Hours. 10 capsule 0    simvastatin (ZOCOR) 10 MG tablet Take 1 tablet by mouth Every Night.      tamsulosin (FLOMAX) 0.4 MG capsule 24 hr capsule Take 1 capsule by mouth Daily. 90 capsule 3     No current facility-administered  medications for this visit.        Allergies:      No Known Allergies     Past Surgical History:     Past Surgical History:   Procedure Laterality Date    COLONOSCOPY      CYSTOSCOPY WITH CLOT EVACUATION N/A 3/25/2024    Procedure: CYSTOSCOPY WITH CLOT EVACUATION AND POSSIBLE FULGRATION;  Surgeon: Mega Lester MD;  Location: [facility];  Service: Urology;  Laterality: N/A;    NO PAST SURGERIES      PROSTATE SURGERY         Social History:     Social History     Socioeconomic History    Marital status: Unknown   Tobacco Use    Smoking status: Former     Types: Cigarettes    Smokeless tobacco: Never   Vaping Use    Vaping status: Never Used   Substance and Sexual Activity    Alcohol use: No    Drug use: No    Sexual activity: Defer     Partners: Female     Birth control/protection: None       Family History:     Family History   Problem Relation Age of Onset    No Known Problems Father     No Known Problems Mother        Review of Systems:     Review of Systems   Constitutional: Negative.    HENT: Negative.     Eyes: Negative.    Respiratory: Negative.     Cardiovascular: Negative.    Gastrointestinal: Negative.    Endocrine: Negative.    Musculoskeletal: Negative.    Allergic/Immunologic: Negative.    Neurological: Negative.    Hematological: Negative.    Psychiatric/Behavioral: Negative.         Physical Exam:     Physical Exam  Vitals and nursing note reviewed.   Constitutional:       Appearance: He is well-developed.   HENT:      Head: Normocephalic and atraumatic.   Eyes:      Conjunctiva/sclera: Conjunctivae normal.      Pupils: Pupils are equal, round, and reactive to light.   Cardiovascular:      Rate and Rhythm: Normal rate and regular rhythm.      Heart sounds: Normal heart sounds.   Pulmonary:      Effort: Pulmonary effort is normal.      Breath sounds: Normal breath sounds.   Abdominal:      General: Bowel sounds are normal.      Palpations: Abdomen is soft.   Musculoskeletal:         General:  Normal range of motion.      Cervical back: Normal range of motion.   Skin:     General: Skin is warm and dry.   Neurological:      Mental Status: He is alert and oriented to person, place, and time.      Deep Tendon Reflexes: Reflexes are normal and symmetric.   Psychiatric:         Behavior: Behavior normal.         Thought Content: Thought content normal.         Judgment: Judgment normal.         I have reviewed the following portions of the patient's history: Allergies, current medications, past family history, past medical history, past social history, past surgical history, problem list, and ROS and confirm it is accurate.    Recent Image (CT and/or KUB):      CT Abdomen and Pelvis: No results found for this or any previous visit.       CT Stone Protocol: Results for orders placed in visit on 03/20/24    CT Abdomen Pelvis Stone Protocol    Narrative  PROCEDURE: CT ABDOMEN PELVIS STONE PROTOCOL-    HISTORY: GROSS HEMATURIA    COMPARISON: 3/20/2024.    PROCEDURE: Axial images were obtained from the lung bases through the  pubic symphysis without intravenous contrast. . This study was performed  with techniques to keep radiation doses as low as reasonably achievable  (ALARA). Individualized dose reduction techniques using automated  exposure control or adjustment of mA and/or kV according to the patient  size were employed.    FINDINGS:    ABDOMEN: There is a partially imaged 2.2 cm right paraspinal soft tissue  density. The lung bases are otherwise clear. The heart size is normal.  Noncontrast images of the liver are unremarkable with no focal hepatic  lesionsl. The spleen is normal. No adrenal masses are seen.  The  pancreas has an unremarkable unenhanced appearance.. The aorta is normal  in caliber. There is no significant free fluid or adenopathy. There is  no nephrolithiasis. There is no hydronephrosis. Limited noncontrast  images of the bowel are unremarkable.    PELVIS: The appendix is not identified.  There are a few scattered  colonic diverticula without evidence of diverticulitis. There is  hyperdense material in the urinary bladder consistent with blood  products. The prostate gland is enlarged. There is no significant fluid  or adenopathy. Bone windows reveal no acute osseous abnormalities or  lytic/destructive lesions. The extraperitoneal soft tissues are  unremarkable.    Impression  1. Blood products within the urinary bladder which is of unknown  etiology. Cystoscopy may prove useful in further evaluation.  2. Enlargement of the prostate gland.  3. No nephrolithiasis or hydronephrosis.  4. Partially imaged right paraspinal soft tissue density mass in the  chest. Follow-up CT of the chest with contrast is recommended.          This report was finalized on 3/20/2024 4:22 PM by Cindy aBrone M.D..       KUB: Results for orders placed during the hospital encounter of 05/25/21    XR Abdomen KUB    Narrative  EXAM:  XR Abdomen, 1 View    EXAM DATE:  5/25/2021 3:56 PM    CLINICAL HISTORY:  BILATERAL FLANK PAIN; R31.0-Gross hematuria; R10.9-Unspecified  abdominal pain    TECHNIQUE:  Frontal supine view of the abdomen/pelvis.    COMPARISON:  No relevant prior studies available.    FINDINGS:  GASTROINTESTINAL TRACT:  Abundant stool throughout the colon.  No  dilation.  BONES/JOINTS:  Unremarkable.    Impression  Abundant stool throughout the colon.    This report was finalized on 5/26/2021 8:49 AM by Dr. Ayden Nicole MD.       Labs (past 3 months):      Office Visit on 03/20/2024   Component Date Value Ref Range Status    Urine Culture 03/20/2024 No growth   Final    WBC 03/20/2024 10.94 (H)  3.40 - 10.80 10*3/mm3 Final    RBC 03/20/2024 5.22  4.14 - 5.80 10*6/mm3 Final    Hemoglobin 03/20/2024 15.7  13.0 - 17.7 g/dL Final    Hematocrit 03/20/2024 46.3  37.5 - 51.0 % Final    MCV 03/20/2024 88.7  79.0 - 97.0 fL Final    MCH 03/20/2024 30.1  26.6 - 33.0 pg Final    MCHC 03/20/2024 33.9  31.5 - 35.7 g/dL Final     RDW 03/20/2024 14.5  12.3 - 15.4 % Final    RDW-SD 03/20/2024 46.0  37.0 - 54.0 fl Final    MPV 03/20/2024 10.1  6.0 - 12.0 fL Final    Platelets 03/20/2024 280  140 - 450 10*3/mm3 Final    Glucose 03/20/2024 152 (H)  65 - 99 mg/dL Final    BUN 03/20/2024 11  8 - 23 mg/dL Final    Creatinine 03/20/2024 0.95  0.76 - 1.27 mg/dL Final    Sodium 03/20/2024 136  136 - 145 mmol/L Final    Potassium 03/20/2024 4.0  3.5 - 5.2 mmol/L Final    Chloride 03/20/2024 99  98 - 107 mmol/L Final    CO2 03/20/2024 20.6 (L)  22.0 - 29.0 mmol/L Final    Calcium 03/20/2024 9.4  8.2 - 9.6 mg/dL Final    Total Protein 03/20/2024 7.0  6.0 - 8.5 g/dL Final    Albumin 03/20/2024 3.7  3.5 - 5.2 g/dL Final    ALT (SGPT) 03/20/2024 22  1 - 41 U/L Final    AST (SGOT) 03/20/2024 21  1 - 40 U/L Final    Alkaline Phosphatase 03/20/2024 95  39 - 117 U/L Final    Total Bilirubin 03/20/2024 0.4  0.0 - 1.2 mg/dL Final    Globulin 03/20/2024 3.3  gm/dL Final    A/G Ratio 03/20/2024 1.1  g/dL Final    BUN/Creatinine Ratio 03/20/2024 11.6  7.0 - 25.0 Final    Anion Gap 03/20/2024 16.4 (H)  5.0 - 15.0 mmol/L Final    eGFR 03/20/2024 76.0  >60.0 mL/min/1.73 Final   Lab on 02/06/2024   Component Date Value Ref Range Status    Urine Culture 02/06/2024 50,000 CFU/mL Mixed Pepper Isolated   Final        Procedure:       Assessment/Plan:   Gross hematuria-consequence of recurrent and residual adenoma as a consequence of a TURP done by Dr. Arsalan Valdovinos.  Currently doing better he is on alpha blockade.  His urine is clear.  He is incontinent.  We discussed Churchill versus PAD management I have nothing else to offer him urologically given his age, comorbidities mental status etc. his daughter was present discussed at length              This document has been electronically signed by KATIE MARTEL MD April 1, 2024 13:10 EDT    Dictated Utilizing Dragon Dictation: Part of this note may be an electronic transcription/translation of spoken language to printed  text using the Dragon Dictation System.

## 2024-04-04 ENCOUNTER — HOSPITAL ENCOUNTER (OUTPATIENT)
Facility: HOSPITAL | Age: 89
Discharge: HOME | End: 2024-04-04
Payer: MEDICARE

## 2024-04-04 DIAGNOSIS — M79.89 PARASPINAL SOFT TISSUE MASS: ICD-10-CM

## 2024-04-04 PROCEDURE — 71260 CT THORAX DX C+: CPT

## 2024-04-04 PROCEDURE — 25510000001 IOPAMIDOL 61 % SOLUTION

## 2024-04-04 RX ADMIN — IOPAMIDOL 100 ML: 612 INJECTION, SOLUTION INTRAVENOUS at 10:22

## 2024-04-08 ENCOUNTER — TELEPHONE (OUTPATIENT)
Dept: UROLOGY | Facility: CLINIC | Age: 89
End: 2024-04-08
Payer: MEDICARE

## 2024-04-08 ENCOUNTER — LAB (OUTPATIENT)
Dept: UROLOGY | Facility: CLINIC | Age: 89
End: 2024-04-08
Payer: MEDICARE

## 2024-04-08 ENCOUNTER — TELEPHONE (OUTPATIENT)
Dept: UROLOGY | Facility: CLINIC | Age: 89
End: 2024-04-08

## 2024-04-08 DIAGNOSIS — N39.0 URINARY TRACT INFECTION WITH HEMATURIA, SITE UNSPECIFIED: Primary | ICD-10-CM

## 2024-04-08 DIAGNOSIS — R31.9 URINARY TRACT INFECTION WITH HEMATURIA, SITE UNSPECIFIED: Primary | ICD-10-CM

## 2024-04-08 PROCEDURE — 87086 URINE CULTURE/COLONY COUNT: CPT | Performed by: UROLOGY

## 2024-04-08 NOTE — TELEPHONE ENCOUNTER
Caller: JUAN CARLOS MARK    Relationship: DAUGHTER    Best call back number: [phone]    What is the best time to reach you: ANYTIME    Who are you requesting to speak with (clinical staff, provider,  specific staff member): DR MARTEL OR CLINICAL      What was the call regarding: PATIENT'S DAUGHTER JUAN CARLOS CALLED IN STATING HE HAS AN APPT TODAY AT 1PM W/ DR MARTEL. SHE GAVE HIM HALF OF A SLEEPING PILL LAST NIGHT SO HE WOULDN'T BE UP ALL NIGHT BECAUSE SHE BELIEVES HE HAS A UTI, BUT THE PATIENT IS TOO TIRED TO MAKE IT TO HIS APPT. SHE WAS NEEDING ASSISTANCE ON WHAT TO DO. SHE SAID SHE CAN BRING IN A URINE SAMPLE OR IF THE DR COULD PRESCRIBE HIM SOMETHING FOR UTI. PLEASE CALL HER BACK FOR ASSISTANCE.

## 2024-04-08 NOTE — TELEPHONE ENCOUNTER
Spoke with Trevon and he said that was fine. Katerine asked if he should start the Macrobid Khurram wrote him to tide him over until he got the culture back and I said since he was disoriented to please start it.

## 2024-04-08 NOTE — TELEPHONE ENCOUNTER
Pt daughter Katerine called and said that the pt was talking and acing out of his head and he tends to do this when having a UTI. She asked if the pt needed to be seen or could an antibiotic be sent in. I consulted with Trevon and he said to bring him in at 1 pm. Shana called and scheduled the pt.

## 2024-04-09 ENCOUNTER — TELEPHONE (OUTPATIENT)
Dept: UROLOGY | Facility: CLINIC | Age: 89
End: 2024-04-09
Payer: MEDICARE

## 2024-04-09 NOTE — TELEPHONE ENCOUNTER
Provider: DR MARTEL    Caller: JUAN CARLOS MARK    Relationship to Patient: DAUGHTER    Reason for Call: PLEASE REACH OUT TO JUAN CARLOS [phone]    When was the patient last seen: DROPPED OFF URINE SAMPLE YESTERDAY ASKING IF A STRONGER ANTIBIOTIC IS NEEDED. PLEASE REACH OUT TO JUAN CARLOS, IF SHE DOES NOT ANSWER, YOU MAY LEAVE A MESSAGE

## 2024-04-10 LAB — BACTERIA SPEC AEROBE CULT: ABNORMAL

## 2024-06-18 DIAGNOSIS — R31.0 GROSS HEMATURIA: ICD-10-CM

## 2024-06-18 DIAGNOSIS — N39.0 URINARY TRACT INFECTION WITH HEMATURIA, SITE UNSPECIFIED: ICD-10-CM

## 2024-06-18 DIAGNOSIS — R35.0 FREQUENCY OF MICTURITION: ICD-10-CM

## 2024-06-18 DIAGNOSIS — R31.9 URINARY TRACT INFECTION WITH HEMATURIA, SITE UNSPECIFIED: ICD-10-CM

## 2024-06-18 RX ORDER — TAMSULOSIN HYDROCHLORIDE 0.4 MG/1
1 CAPSULE ORAL DAILY
Qty: 30 CAPSULE | Refills: 3 | Status: SHIPPED | OUTPATIENT
Start: 2024-06-18

## 2024-11-11 ENCOUNTER — TELEPHONE (OUTPATIENT)
Dept: UROLOGY | Facility: CLINIC | Age: 89
End: 2024-11-11

## 2024-11-11 NOTE — TELEPHONE ENCOUNTER
Caller:  Melony Garcia     Relationship:  SPOUSE    Best call back number: [phone]     PATIENT CALLED REQUESTING TO CANCEL SAME DAY APPT.    Did the patient call AFTER the start time of their scheduled appointment?  []YES  [x]NO    Was the patient's appointment rescheduled? []YES  [x]NO    Any additional information: PT ISN'T ABLE TO WALK THIS MORNING, PT'S WIFE WILL CALL TO RESCHEDULE.

## 2024-12-02 ENCOUNTER — LAB (OUTPATIENT)
Dept: UROLOGY | Facility: CLINIC | Age: 89
End: 2024-12-02
Payer: MEDICARE

## 2024-12-02 DIAGNOSIS — R31.9 URINARY TRACT INFECTION WITH HEMATURIA, SITE UNSPECIFIED: Primary | ICD-10-CM

## 2024-12-02 DIAGNOSIS — N39.0 URINARY TRACT INFECTION WITH HEMATURIA, SITE UNSPECIFIED: Primary | ICD-10-CM

## 2024-12-02 PROCEDURE — 87077 CULTURE AEROBIC IDENTIFY: CPT

## 2024-12-02 PROCEDURE — 87086 URINE CULTURE/COLONY COUNT: CPT

## 2024-12-02 PROCEDURE — 87186 SC STD MICRODIL/AGAR DIL: CPT

## 2024-12-04 ENCOUNTER — TELEPHONE (OUTPATIENT)
Dept: UROLOGY | Facility: CLINIC | Age: 89
End: 2024-12-04

## 2024-12-04 DIAGNOSIS — N39.0 URINARY TRACT INFECTION WITH HEMATURIA, SITE UNSPECIFIED: Primary | ICD-10-CM

## 2024-12-04 DIAGNOSIS — R31.9 URINARY TRACT INFECTION WITH HEMATURIA, SITE UNSPECIFIED: Primary | ICD-10-CM

## 2024-12-04 LAB — BACTERIA SPEC AEROBE CULT: ABNORMAL

## 2024-12-04 RX ORDER — CEFDINIR 300 MG/1
300 CAPSULE ORAL EVERY 12 HOURS
Qty: 20 CAPSULE | Refills: 0 | Status: SHIPPED | OUTPATIENT
Start: 2024-12-04

## 2024-12-04 NOTE — TELEPHONE ENCOUNTER
Called patient advise urine culture was positive we will send in cefdinir twice daily for 10 days.  Advised to call back with questions or concerns

## 2024-12-27 DIAGNOSIS — R91.1 PULMONARY NODULE: Primary | ICD-10-CM

## 2025-03-07 ENCOUNTER — LAB REQUISITION (OUTPATIENT)
Dept: LAB | Facility: HOSPITAL | Age: OVER 89
End: 2025-03-07
Payer: MEDICARE

## 2025-03-07 DIAGNOSIS — E78.2 MIXED HYPERLIPIDEMIA: ICD-10-CM

## 2025-03-07 LAB
BASOPHILS # BLD AUTO: 0.08 10*3/MM3 (ref 0–0.2)
BASOPHILS NFR BLD AUTO: 1.1 % (ref 0–1.5)
DEPRECATED RDW RBC AUTO: 46 FL (ref 37–54)
EOSINOPHIL # BLD AUTO: 0.52 10*3/MM3 (ref 0–0.4)
EOSINOPHIL NFR BLD AUTO: 6.9 % (ref 0.3–6.2)
ERYTHROCYTE [DISTWIDTH] IN BLOOD BY AUTOMATED COUNT: 13.7 % (ref 12.3–15.4)
HBA1C MFR BLD: 7.7 % (ref 4.8–5.6)
HCT VFR BLD AUTO: 45.8 % (ref 37.5–51)
HGB BLD-MCNC: 15.3 G/DL (ref 13–17.7)
IMM GRANULOCYTES # BLD AUTO: 0.02 10*3/MM3 (ref 0–0.05)
IMM GRANULOCYTES NFR BLD AUTO: 0.3 % (ref 0–0.5)
LYMPHOCYTES # BLD AUTO: 1.88 10*3/MM3 (ref 0.7–3.1)
LYMPHOCYTES NFR BLD AUTO: 24.9 % (ref 19.6–45.3)
MCH RBC QN AUTO: 30.4 PG (ref 26.6–33)
MCHC RBC AUTO-ENTMCNC: 33.4 G/DL (ref 31.5–35.7)
MCV RBC AUTO: 91.1 FL (ref 79–97)
MONOCYTES # BLD AUTO: 0.73 10*3/MM3 (ref 0.1–0.9)
MONOCYTES NFR BLD AUTO: 9.7 % (ref 5–12)
NEUTROPHILS NFR BLD AUTO: 4.31 10*3/MM3 (ref 1.7–7)
NEUTROPHILS NFR BLD AUTO: 57.1 % (ref 42.7–76)
NRBC BLD AUTO-RTO: 0 /100 WBC (ref 0–0.2)
PLATELET # BLD AUTO: 204 10*3/MM3 (ref 140–450)
PMV BLD AUTO: 9.8 FL (ref 6–12)
RBC # BLD AUTO: 5.03 10*6/MM3 (ref 4.14–5.8)
WBC NRBC COR # BLD AUTO: 7.54 10*3/MM3 (ref 3.4–10.8)

## 2025-03-07 PROCEDURE — 83036 HEMOGLOBIN GLYCOSYLATED A1C: CPT | Performed by: NURSE PRACTITIONER

## 2025-03-07 PROCEDURE — 80053 COMPREHEN METABOLIC PANEL: CPT | Performed by: NURSE PRACTITIONER

## 2025-03-07 PROCEDURE — 85025 COMPLETE CBC W/AUTO DIFF WBC: CPT | Performed by: NURSE PRACTITIONER

## 2025-03-07 PROCEDURE — 82306 VITAMIN D 25 HYDROXY: CPT | Performed by: NURSE PRACTITIONER

## 2025-03-07 PROCEDURE — 80061 LIPID PANEL: CPT | Performed by: NURSE PRACTITIONER

## 2025-03-07 PROCEDURE — 84436 ASSAY OF TOTAL THYROXINE: CPT | Performed by: NURSE PRACTITIONER

## 2025-03-07 PROCEDURE — 84443 ASSAY THYROID STIM HORMONE: CPT | Performed by: NURSE PRACTITIONER

## 2025-03-08 LAB
25(OH)D3 SERPL-MCNC: 14.9 NG/ML (ref 30–100)
ALBUMIN SERPL-MCNC: 3.5 G/DL (ref 3.5–5.2)
ALBUMIN/GLOB SERPL: 1.5 G/DL
ALP SERPL-CCNC: 92 U/L (ref 39–117)
ALT SERPL W P-5'-P-CCNC: 16 U/L (ref 1–41)
ANION GAP SERPL CALCULATED.3IONS-SCNC: 12 MMOL/L (ref 5–15)
AST SERPL-CCNC: 20 U/L (ref 1–40)
BILIRUB SERPL-MCNC: 0.4 MG/DL (ref 0–1.2)
BUN SERPL-MCNC: 14 MG/DL (ref 8–23)
BUN/CREAT SERPL: 16.3 (ref 7–25)
CALCIUM SPEC-SCNC: 8.6 MG/DL (ref 8.2–9.6)
CHLORIDE SERPL-SCNC: 105 MMOL/L (ref 98–107)
CHOLEST SERPL-MCNC: 158 MG/DL (ref 0–200)
CO2 SERPL-SCNC: 20 MMOL/L (ref 22–29)
CREAT SERPL-MCNC: 0.86 MG/DL (ref 0.76–1.27)
EGFRCR SERPLBLD CKD-EPI 2021: 81.7 ML/MIN/1.73
GLOBULIN UR ELPH-MCNC: 2.4 GM/DL
GLUCOSE SERPL-MCNC: 210 MG/DL (ref 65–99)
HDLC SERPL-MCNC: 24 MG/DL (ref 40–60)
LDLC SERPL CALC-MCNC: 111 MG/DL (ref 0–100)
LDLC/HDLC SERPL: 4.55 {RATIO}
POTASSIUM SERPL-SCNC: 4.5 MMOL/L (ref 3.5–5.2)
PROT SERPL-MCNC: 5.9 G/DL (ref 6–8.5)
SODIUM SERPL-SCNC: 137 MMOL/L (ref 136–145)
T4 SERPL-MCNC: 7.13 MCG/DL (ref 4.5–11.7)
TRIGL SERPL-MCNC: 124 MG/DL (ref 0–150)
TSH SERPL DL<=0.05 MIU/L-ACNC: 1.33 UIU/ML (ref 0.27–4.2)
VLDLC SERPL-MCNC: 23 MG/DL (ref 5–40)

## (undated) DEVICE — EVAC BLDR UROVAC W ADAPT

## (undated) DEVICE — DRSNG TELFA PAD NONADH STR 1S 3X4IN

## (undated) DEVICE — COR CYSTO: Brand: MEDLINE INDUSTRIES, INC.

## (undated) DEVICE — GOWN,REINF,POLY,ECL,PP SLV,XXL: Brand: MEDLINE

## (undated) DEVICE — NDL HYPO ECLPS SFTY 18G 1 1/2IN

## (undated) DEVICE — GLV SURG PREMIERPRO MIC LTX PF SZ8 BRN